# Patient Record
Sex: MALE | Race: WHITE | NOT HISPANIC OR LATINO | Employment: OTHER | URBAN - METROPOLITAN AREA
[De-identification: names, ages, dates, MRNs, and addresses within clinical notes are randomized per-mention and may not be internally consistent; named-entity substitution may affect disease eponyms.]

---

## 2017-01-04 ENCOUNTER — TELEPHONE (OUTPATIENT)
Dept: RHEUMATOLOGY | Facility: CLINIC | Age: 57
End: 2017-01-04

## 2017-01-04 NOTE — TELEPHONE ENCOUNTER
----- Message from Sarah Barrett sent at 1/3/2017  4:02 PM CST -----  Contact: self@home  Pt called in because he used to see Dr. Stacey Steinberg. Pt has Rheumatoid Arthritis and has not taken any medication in a while. Pt needs to see a Physician, but is looking to come in sooner than 3/2/2017, which was the earliest available.    Please call back at home number    Thank you

## 2017-01-05 ENCOUNTER — TELEPHONE (OUTPATIENT)
Dept: RHEUMATOLOGY | Facility: CLINIC | Age: 57
End: 2017-01-05

## 2017-01-05 NOTE — TELEPHONE ENCOUNTER
----- Message from Sarah Barrett sent at 1/4/2017  4:25 PM CST -----  Contact: self@home  Pt called in returning June's call regarding getting an appt sooner than 3/2/2017    Please call back at home number    Thank you

## 2017-01-11 ENCOUNTER — OFFICE VISIT (OUTPATIENT)
Dept: RHEUMATOLOGY | Facility: CLINIC | Age: 57
End: 2017-01-11
Payer: COMMERCIAL

## 2017-01-11 ENCOUNTER — HOSPITAL ENCOUNTER (OUTPATIENT)
Dept: RADIOLOGY | Facility: HOSPITAL | Age: 57
Discharge: HOME | End: 2017-01-11
Attending: INTERNAL MEDICINE
Payer: COMMERCIAL

## 2017-01-11 VITALS
SYSTOLIC BLOOD PRESSURE: 143 MMHG | RESPIRATION RATE: 20 BRPM | DIASTOLIC BLOOD PRESSURE: 82 MMHG | WEIGHT: 209.38 LBS | HEART RATE: 94 BPM

## 2017-01-11 DIAGNOSIS — M25.471 RIGHT ANKLE SWELLING: ICD-10-CM

## 2017-01-11 DIAGNOSIS — M10.00 ACUTE IDIOPATHIC GOUT, UNSPECIFIED SITE: Primary | ICD-10-CM

## 2017-01-11 DIAGNOSIS — M10.00 ACUTE IDIOPATHIC GOUT, UNSPECIFIED SITE: ICD-10-CM

## 2017-01-11 DIAGNOSIS — M25.50 ARTHRALGIA, UNSPECIFIED JOINT: ICD-10-CM

## 2017-01-11 PROCEDURE — 99204 OFFICE O/P NEW MOD 45 MIN: CPT | Mod: S$GLB,,, | Performed by: INTERNAL MEDICINE

## 2017-01-11 PROCEDURE — 99999 PR PBB SHADOW E&M-EST. PATIENT-LVL III: CPT | Mod: PBBFAC,,, | Performed by: INTERNAL MEDICINE

## 2017-01-11 PROCEDURE — 77077 JOINT SURVEY SINGLE VIEW: CPT | Mod: 26,,, | Performed by: RADIOLOGY

## 2017-01-11 PROCEDURE — 1159F MED LIST DOCD IN RCRD: CPT | Mod: S$GLB,,, | Performed by: INTERNAL MEDICINE

## 2017-01-11 PROCEDURE — 77077 JOINT SURVEY SINGLE VIEW: CPT | Mod: TC

## 2017-01-11 RX ORDER — OMEGA-3-ACID ETHYL ESTERS 1 G/1
CAPSULE, LIQUID FILLED ORAL
Refills: 0 | COMMUNITY
Start: 2016-10-11

## 2017-01-11 RX ORDER — CETIRIZINE HYDROCHLORIDE 5 MG/1
5 TABLET ORAL DAILY
COMMUNITY
End: 2021-07-02 | Stop reason: CLARIF

## 2017-01-11 RX ORDER — COLCHICINE 0.6 MG/1
0.6 TABLET ORAL DAILY
Qty: 30 TABLET | Refills: 2 | Status: SHIPPED | OUTPATIENT
Start: 2017-01-11 | End: 2017-02-08 | Stop reason: SDUPTHER

## 2017-01-11 RX ORDER — GABAPENTIN 300 MG/1
300 CAPSULE ORAL
COMMUNITY
Start: 2017-01-10

## 2017-01-11 RX ORDER — METOPROLOL SUCCINATE 100 MG/1
TABLET, EXTENDED RELEASE ORAL
COMMUNITY
Start: 2017-01-10

## 2017-01-11 RX ORDER — DULOXETIN HYDROCHLORIDE 60 MG/1
CAPSULE, DELAYED RELEASE ORAL
COMMUNITY
Start: 2017-01-10

## 2017-01-11 ASSESSMENT — ROUTINE ASSESSMENT OF PATIENT INDEX DATA (RAPID3)
AM STIFFNESS SCORE: 1, YES
MDHAQ FUNCTION SCORE: .7
FATIGUE SCORE: 10
PAIN SCORE: 9
WHEN YOU AWAKENED IN THE MORNING OVER THE LAST WEEK, PLEASE INDICATE THE AMOUNT OF TIME IT TAKES UNTIL YOU ARE AS LIMBER AS YOU WILL BE FOR THE DAY: 1 HR
PSYCHOLOGICAL DISTRESS SCORE: 4.4
TOTAL RAPID3 SCORE: 6.11
PATIENT GLOBAL ASSESSMENT SCORE: 7

## 2017-01-11 NOTE — PROGRESS NOTES
I have personally taken the history and examined the patient to verify the fellow's notation, and I agree with the impression and plan stated.     He has documented polyarticular gout and returns with clinical tophi on physical examination.  He is currently suffering from acute gout in the right ankle so I agree with the fellow's plan to start colchicine acutely, obtain laboratory studies, and then add allopurinol in the very near future.  He understands that he will need to stay on allopurinol.

## 2017-01-11 NOTE — PROGRESS NOTES
Subjective:       Patient ID: Turner Silva is a 56 y.o. male.    Chief Complaint:Gout    HPI This is 56 yr old male with pmh significant for DM, CAD, HTN, Hyperlipidemia, obesity , who is coming in with complains of joint pain . He states many years ago he was having joint pain and was seen by rheumatology Dr Steinberg , he was found to have positive RF 28 and was started on treatment with methotrexate which he took for a year and stopped, he never followed up with rheumatologY  Pt states he has h/o since 19 yr he has h/o of crystal proven gout, first started in the R big toe. He was taking allopurinol 300 mg for 2-3 yrs and then stopped taking it. He has h/o of uric acid stones. He has been treated with colchicine and indomethacin in the past.   He was in usual state of health and a week before cesilia he started having joint pain involving his MCP's, wrists, elbows, Knees elsa and since few days he mainly has joint pain and swelling in his R ankle . He has been taking ibuprofen 2-3 tabs daily which helps with joint pain.He denies any fever, chills, no h/o of trauma.    Pt denies any alopecia,dyshphagia,raynauds,headahce, swollen glands,tight skin,photosensitivity,skin rashes,oral/nasal ulcers,pleurisy, pericarditis,no thromboses.            Review of Systems   Constitutional: Negative for activity change, appetite change, chills, fatigue, fever and unexpected weight change.   HENT: Negative for ear pain, facial swelling, mouth sores and nosebleeds.         Dry mouth   Eyes: Negative for photophobia, pain, redness and itching.        Dry eyes   Respiratory: Negative for cough, chest tightness, shortness of breath and wheezing.    Cardiovascular: Negative for chest pain, palpitations and leg swelling.   Gastrointestinal: Negative for abdominal distention, abdominal pain, constipation, diarrhea, nausea and vomiting.   Genitourinary: Negative for difficulty urinating, dysuria, flank pain, hematuria and  urgency.   Musculoskeletal: Negative for arthralgias, back pain, gait problem, joint swelling, myalgias, neck pain and neck stiffness.   Skin: Negative for color change, pallor and rash.   Neurological: Negative for seizures, weakness, light-headedness, numbness and headaches.   Hematological: Negative for adenopathy.   Psychiatric/Behavioral: Negative for agitation, behavioral problems, confusion and sleep disturbance. The patient is not nervous/anxious.        Past Medical History   Diagnosis Date    Gout     Hyperlipidemia     Hypertension     Myocardial infarction      Past Surgical History   Procedure Laterality Date    Carpal tunnel release Bilateral     Cervical fusion       X 3    Laminectomy      Shoulder surgery Left     Rotator cuff repair Left      X 2         Social History     Social History    Marital status:      Spouse name: N/A    Number of children: N/A    Years of education: N/A     Occupational History    Not on file.     Social History Main Topics    Smoking status: Not on file    Smokeless tobacco: Not on file    Alcohol use Not on file    Drug use: Not on file    Sexual activity: Not on file     Other Topics Concern    Not on file     Social History Narrative     No current outpatient prescriptions on file prior to visit.     No current facility-administered medications on file prior to visit.      Review of patient's allergies indicates:  Allergies not on file      Objective:   There were no vitals taken for this visit.     Physical Exam   Constitutional: He is oriented to person, place, and time and well-developed, well-nourished, and in no distress. No distress.   HENT:   Head: Normocephalic and atraumatic.   Nose: Nose normal.   Mouth/Throat: Oropharynx is clear and moist.   No rashes,scaling,alopecia, oral ulcers.  Ear lobe tophi present .   Eyes: Conjunctivae and EOM are normal. Pupils are equal, round, and reactive to light.   Neck: Normal range of motion.  Neck supple. No thyromegaly present.   Cardiovascular: Normal rate, regular rhythm and normal heart sounds.  Exam reveals no friction rub.    No murmur heard.  Pulmonary/Chest: Effort normal and breath sounds normal. No respiratory distress. He has no wheezes. He has no rales.   Abdominal: Soft. Bowel sounds are normal. He exhibits no distension. There is no tenderness.   Lymphadenopathy:     He has no cervical adenopathy.   Neurological: He is alert and oriented to person, place, and time.   Skin: Skin is warm. No rash noted. He is not diaphoretic.     Psychiatric: Affect and judgment normal.   Musculoskeletal: Normal range of motion. He exhibits no edema or tenderness.   Cspine FROM no tenderness  Tspine FROM no tenderness  Lspine FROM no tenderness.  TMJ: unremarkable  Shoulders: FROM; no synovitis;  Elbows: FROM; R elbow nodules .  Wrists: FROM; no synovitis;   MCPs: FROM; no synovitis; + metacarpalgia;  ok;  PIPs:FROM; no synovitis;   DIPs: FROM; no synovitis;   HIPS: FROM  Knees: FROM; no synovitis; no instability;  Ankles: R ankle very tender to palpation, swollen, limited range of motion, L ankle non tender ,no swelling.  Toes: ok; no metatarsalgia            Results for ARIC MARINA (MRN [id number]) as of 1/11/2017 10:13   Ref. Range 1/16/2012 12:05   WBC Latest Ref Range: 4.8 - 10.8 K/uL 8.28   RBC Latest Ref Range: 4.60 - 6.20 M/uL 4.91   Hemoglobin Latest Ref Range: 14.0 - 18.0 gm/dl 14.5   Hematocrit Latest Ref Range: 40.0 - 54.0 % 42.5   MCV Latest Ref Range: 82 - 95 fL 86.6   MCH Latest Ref Range: 27 - 31 pg 29.5   MCHC Latest Ref Range: 32 - 36 % 34.1   RDW Latest Ref Range: 11.5 - 14.5 % 14.1   Platelets Latest Ref Range: 150 - 350 K/uL 271   MPV Latest Ref Range: 9.2 - 12.9 fL 10.2   Gran% Latest Ref Range: 38 - 73 % 66.4   Gran # Latest Ref Range: 1.8 - 7.7 K/uL 5.5   Lymph% Latest Ref Range: 21 - 44 % 25.8   Lymph # Latest Ref Range: 1 - 4.8 K/uL 2.1   Mono% Latest Ref Range: 0.0 - 7.4 %  5.7   Mono # Latest Ref Range: 0.0 - 0.8 K/uL 0.5   Eosinophil% Latest Ref Range: 0.0 - 4.2 % 1.3   Eos # Latest Ref Range: 0 - 0.45 K/uL 0.1   Basophil% Latest Ref Range: 0 - 1.9 % 0.8   Baso # Latest Ref Range: 0.0 - 0.2 K/uL 0.1   Sed Rate Latest Ref Range: 0 - 10 mm/hr 5   Sodium Latest Ref Range: 136 - 145 mmol/L 140   Potassium Latest Ref Range: 3.5 - 5.1 mmol/L 3.3 (L)   Chloride Latest Ref Range: 95 - 110 mmol/L 104   CO2 Latest Ref Range: 23 - 29 mmol/L 24   Anion Gap Latest Ref Range: 8 - 16 mmol/L 12.0   BUN, Bld Latest Ref Range: 6 - 20 mg/dl 13   Creatinine Latest Ref Range: 0.5 - 1.4 mg/dl 0.9   eGFR if non African American Latest Ref Range: >60 mL/min >60   eGFR if  Latest Ref Range: >60 mL/min >60   Glucose Latest Ref Range: 70 - 110 mg/dl 179 (H)   Calcium Latest Ref Range: 8.7 - 10.5 mg/dl 9.1   Alkaline Phosphatase Latest Ref Range: 55 - 135 U/L 76   Total Protein Latest Ref Range: 6.0 - 8.4 g/dL 7.0   Albumin Latest Ref Range: 3.5 - 5.2 g/dl 3.7   Uric Acid Latest Ref Range: 3.4 - 7.0 mg/dl 5.9   Total Bilirubin Latest Ref Range: 0.1 - 1.0 mg/dl 0.4   AST Latest Ref Range: 10 - 40 U/L 14   ALT Latest Ref Range: 10 - 44 U/L 19     Assessment:     This is a 56 yr old male with h/o of HTN, Hyperlipidemia, DM, obesity , uric acid renal stones, h/o of crystal proven gout and tophaceous gout , who is coming in with complains of R ankle pain and swelling and joint pain involving the MCP's, Wrists, elbows , knees elsa.    -R ankle pain and swelling sec to gout attack   -Chronic tophaceous gout  -Arthralgias invovling the MCP's, wrists, elbow and knees elsa  -H/o of uric acid calculi  -HTN  -DM  -Obesity.    Plan;  Check cbc, cmp, esr, crp, RF, CCP, DORIS, uric acid, arthritis survey.  Advised to take colchicine 0.6 mg 2 tabs once then one tab an hour later and continue taking 0.6 bid from next day , and if he has diarrhea advised to take only one tab daily.  He will need long term  hyperuricemic therapy with allopurinol once gout attack resolved.    RTC in 3 weeks.

## 2017-01-13 ENCOUNTER — TELEPHONE (OUTPATIENT)
Dept: RHEUMATOLOGY | Facility: CLINIC | Age: 57
End: 2017-01-13

## 2017-02-08 ENCOUNTER — OFFICE VISIT (OUTPATIENT)
Dept: RHEUMATOLOGY | Facility: CLINIC | Age: 57
End: 2017-02-08
Payer: COMMERCIAL

## 2017-02-08 VITALS
WEIGHT: 210.88 LBS | BODY MASS INDEX: 31.23 KG/M2 | SYSTOLIC BLOOD PRESSURE: 126 MMHG | HEART RATE: 68 BPM | DIASTOLIC BLOOD PRESSURE: 75 MMHG | HEIGHT: 69 IN

## 2017-02-08 DIAGNOSIS — M10.00 IDIOPATHIC GOUT, UNSPECIFIED CHRONICITY, UNSPECIFIED SITE: Primary | ICD-10-CM

## 2017-02-08 DIAGNOSIS — M1A.9XX1 TOPHACEOUS GOUT: ICD-10-CM

## 2017-02-08 PROCEDURE — 99999 PR PBB SHADOW E&M-EST. PATIENT-LVL III: CPT | Mod: PBBFAC,,, | Performed by: INTERNAL MEDICINE

## 2017-02-08 PROCEDURE — 99214 OFFICE O/P EST MOD 30 MIN: CPT | Mod: S$GLB,,, | Performed by: INTERNAL MEDICINE

## 2017-02-08 RX ORDER — COLCHICINE 0.6 MG/1
0.6 TABLET ORAL DAILY
Qty: 90 TABLET | Refills: 0 | Status: SHIPPED | OUTPATIENT
Start: 2017-02-08 | End: 2017-05-24 | Stop reason: SDUPTHER

## 2017-02-08 RX ORDER — VIT C/E/ZN/COPPR/LUTEIN/ZEAXAN 250MG-90MG
1000 CAPSULE ORAL DAILY
COMMUNITY

## 2017-02-08 RX ORDER — ALLOPURINOL 100 MG/1
300 TABLET ORAL 3 TIMES DAILY
Qty: 270 TABLET | Refills: 2 | Status: SHIPPED | OUTPATIENT
Start: 2017-02-08 | End: 2017-02-15

## 2017-02-08 ASSESSMENT — ROUTINE ASSESSMENT OF PATIENT INDEX DATA (RAPID3)
AM STIFFNESS SCORE: 1, YES
MDHAQ FUNCTION SCORE: .5
FATIGUE SCORE: 8
PSYCHOLOGICAL DISTRESS SCORE: 3.3
PATIENT GLOBAL ASSESSMENT SCORE: 5
WHEN YOU AWAKENED IN THE MORNING OVER THE LAST WEEK, PLEASE INDICATE THE AMOUNT OF TIME IT TAKES UNTIL YOU ARE AS LIMBER AS YOU WILL BE FOR THE DAY: 1 HOUR
TOTAL RAPID3 SCORE: 3.56
PAIN SCORE: 4

## 2017-02-08 NOTE — PROGRESS NOTES
I have personally taken the history and examined the patient to verify the fellow's notation, and I agree with the impression and plan stated.   Acute sx have resolved so will now shift to long term treatment of tophaceous gout with urate lowering therapy.  WD

## 2017-02-08 NOTE — PATIENT INSTRUCTIONS
Gout    Gout is an inflammation of a joint due to a build-up of gout crystals in the joint fluid. This occurs when there is an excess of uric acid (a normal waste product) in the body. Uric acid builds up in the body when the kidneys are unable to filter enough of it from the blood. This may occur with age. It is also associated with kidney disease. Gout occurs more often in persons with obesity, diabetes, hypertension, or high levels of fats in the blood. It may be run in families. Gout tends to come and go. A flare up of gout is called an attack. Drinking alcohol or eating certain foods (such as shellfish or foods with additives such as high-fructose corn syrup) may increase uric acid levels in the blood and cause a gout attack.  During a gout attack, the affected joint may become a hot, red, swollen and painful. If you have had one attack of gout, you are likely to have another. An attack of gout can be treated with medicine. If these attacks become frequent, a daily medicine may be prescribed to help the kidneys remove uric acid from the body.  Home care  During a gout attack:  · Rest painful joints. If gout affects the joints of your foot or leg, you may want to use crutches for the first few days to keep from bearing weight on the affected joint.  · When sitting or lying down, raise the painful joint to a level higher than your heart.  · Apply an ice pack (ice cubes in a plastic bag wrapped in a thin towel) over the injured area for 20 minutes every 1-2 hours the first day for pain relief. Continue this 3-4 times a day for swelling and pain.  · Avoid alcohol and foods listed below (see Preventing attacks) during a gout attack. Drink extra fluid to help flush the uric acid through your kidneys.  · If you were prescribed a medication to treat gout, take it as your healthcare provider has instructed. Don't skip doses.  · Take anti-inflammatory medicine as directed.   · If pain medicines have been prescribed,  take them exactly as directed.    Preventing attacks  · Minimize or avoid alcohol use. Excess alcohol intake can cause a gout attack.  · Limit these foods and beverages:  ¨ Organ meats, such as kidneys and liver  ¨ Certain seafoods (anchovies, sardines, shrimp, scallops, herring, mackerel)  ¨ Wild game, meat extracts and meat gravies  ¨ Foods and beverages sweetened with high-fructose corn syrup, such as sodas  · Eat a healthy diet including low-fat and nonfat dairy, whole grains, and vegetables.  · If you are overweight, talk to your healthcare provider about a weight reduction plan. Avoid fasting or extreme low calorie diets (less than 900 calories per day). This will increase uric acid levels in the body.  · If you have diabetes or high blood pressure, work with your doctor to manage these conditions.  · Protect the joint from injury. Trauma can trigger a gout attack.  Follow-up care  Follow up with your healthcare provider or as advised.   When to seek medical advice  Call your healthcare provider if you have any of the following:  · Fever over 100.4°F (38.ºC) with worsening joint pain  · Increasing redness around the joint  · Pain developing in another joint  · Repeated vomiting, abdominal pain, or blood in the vomit or stool (black or red color)  Date Last Reviewed: 5/14/2015  © 1880-4085 [employer]. [address] [place] [address], [place] [address]. All rights reserved. This information is not intended as a substitute for professional medical care. Always follow your healthcare professional's instructions.        Treating Gout Attacks     Raising the joint above the level of your heart can help reduce gout symptoms.     Gout is a disease that affects the joints. It is caused by excess uric acid in your blood stream that may lead to crystals forming in your joints. Left untreated, it can lead to painful foot and joint deformities and even kidney problems. But, by treating gout early, you can relieve  pain and help prevent future problems. Gout can usually be treated with medication and proper diet. In severe cases, surgery may be needed.  Gout attacks are painful and often happen more than once. Taking medications may reduce pain and prevent attacks in the future. There are also some things you can do at home to relieve symptoms.  Medications for gout  Your healthcare provider may prescribe a daily medication to reduce levels of uric acid. Reducing your uric acid levels may help prevent gout attacks. Allopurinol is one commonly used medication taken daily to reduce uric acid levels. Other medications can help relieve pain and swelling during an acute attack. Medicines such as NSAIDs (nonsteroidal anti-inflammatory medicines), steroids, and colchicine may be prescribed for intermittent use to relieve an acute gout attack. Be sure to take your medication as directed.  What you can do  Below are some things you can do at home to relieve gout symptoms. Your healthcare provider may have other tips.  · Rest the painful joint as much as you can.  · Raise the painful joint so it is at a level higher than your heart.  · Use ice for 10 minutes every 1-2 hours as possible.  How can I prevent gout?  With a little effort, you may be able to prevent gout attacks in the future. Here are some things you can do:  · Avoid foods high in purines  ¨ Certain meats (red meat, processed meat, turkey)  ¨ Organ meats (kidney, liver, sweetbread)  ¨ Shellfish (lobster, crab, shrimp, scallop, mussel)  ¨ Certain fish (anchovy, sardine, herring, mackerel)  · Take any medications prescribed by your healthcare provider.  · Lose weight if you need to.  · Reduce high fructose corn syrup in meals and drinks.  · Reduce or eliminate consumption of alcohol, particularly beer, but also red wine and spirits.  · Control blood pressure, diabetes, and cholesterol.  · Drink plenty of water to help flush uric acid from your body.  Date Last Reviewed:  2/1/2016  © 8873-9438 [employer]. [address] [place] [address], [place] [address]. All rights reserved. This information is not intended as a substitute for professional medical care. Always follow your healthcare professional's instructions.        Gout Diet  Gout is a painful condition caused by an excess of uric acid, a waste product made by the body. Uric acid forms crystals that collect in the joints. The immune response to these crystals brings on symptoms of joint pain and swelling. This is called a gout attack. Often, medications and diet changes are combined to manage gout. Below are some guidelines for changing your diet to help you manage gout and prevent attacks. Your health care provider will help you determine the best eating plan for you.     Eating to manage gout  Weight loss for those who are overweight may help reduce gout attacks.  Eat less of these foods  Eating too many foods containing purines may raise the levels of uric acid in your body. This raises your risk for a gout attack. Try to limit these foods and drinks:  · Alcohol, such as beer and red wine. You may be told to avoid alcohol completely.  · Soft drinks that contain sugar or high fructose corn syrup  · Certain fish, including anchovies, sardines, fish eggs, and herring  · Shellfish  · Certain meats, such as red meat, hot dogs, luncheon meats, and turkey  · Organ meats, such as liver, kidneys, and sweetbreads  · Legumes, such as dried beans and peas  · Other high fat foods such as gravy, whole milk, and high fat cheeses  · Vegetables such as asparagus, cauliflower, spinach, and mushrooms used to be thought to contribute to an increased risk for a gout attack, but recent studies show that high purine vegetables don't increase the risk for a gout attack.  Eat more of these foods  Other foods may be helpful for people with gout. Add some of these foods to your diet:  · Cherries contain chemicals that may lower uric  acid.  · Omega fatty acids. These are found in some fatty fish such as salmon, certain oils (flax, olive, or nut), and nuts themselves. Omega fatty acids may help prevent inflammation due to gout.  · Dairy products that are low-fat or fat-free, such as cheese and yogurt  · Complex carbohydrate foods, including whole grains, brown rice, oats, and beans  · Coffee, in moderation  · Water, approximately 64 ounces per day  Follow-up care  Follow up with your healthcare provider as advised.  When to seek medical advice  Call your healthcare provider right away if any of these occur:  · Return of gout symptoms, usually at night:  · Severe pain, swelling, and heat in a joint, especially the base of the big toe  · Affected joint is hard to move  · Skin of the affected joint is purple or red  · Fever of 100.4°F (38°C) or higher  · Pain that doesn't get better even with prescribed medicine   Date Last Reviewed: 1/12/2016  © 8799-3007 [employer]. [address] [place] [address], [place] [address]. All rights reserved. This information is not intended as a substitute for professional medical care. Always follow your healthcare professional's instructions.

## 2017-02-08 NOTE — PROGRESS NOTES
"Subjective:       Patient ID: Turner Silva is a 56 y.o. male.    Chief Complaint: Polyarticular gout     HPI 56 yr old male with pmh significant for DM, CAD, HTN, Hyperlipidemia, obesity ,since 19 yr he has h/o of crystal proven gout,h/o of podagra and tophaceous gout. He was taking allopurinol 300 mg for 2-3 yrs and then stopped taking it. He has h/o of uric acid stones. He has been treated with colchicine and indomethacin in the past.   Last visit pt was seen for gout flare in his R ankle , advised to take colchicine 0.6 take colchicine 0.6 mg 2 tabs once then one tab an hour later and continue taking 0.6 bid . His gout flare in R ankle has resolved. He denies any joint pain or swelling. His uric acid was 7.8 .Denies any other symptoms.     Review of Systems   Constitutional: Negative for activity change, appetite change, chills, fatigue, fever and unexpected weight change.   HENT: Negative for ear pain, facial swelling, mouth sores and nosebleeds.    Eyes: Negative for photophobia, pain, redness and itching.   Respiratory: Negative for cough, chest tightness, shortness of breath and wheezing.    Cardiovascular: Negative for chest pain, palpitations and leg swelling.   Gastrointestinal: Negative for abdominal distention, abdominal pain, constipation, diarrhea, nausea and vomiting.   Genitourinary: Negative for difficulty urinating, dysuria, flank pain, hematuria and urgency.   Musculoskeletal: Negative for arthralgias, back pain, gait problem, joint swelling, myalgias, neck pain and neck stiffness.   Skin: Negative for color change, pallor and rash.   Neurological: Negative for seizures, weakness, light-headedness, numbness and headaches.   Hematological: Negative for adenopathy.   Psychiatric/Behavioral: Negative for agitation, behavioral problems, confusion and sleep disturbance. The patient is not nervous/anxious.          Objective:     Visit Vitals    /75    Pulse 68    Ht 5' 9" (1.753 m)    " Wt 95.7 kg (210 lb 14.4 oz)    BMI 31.14 kg/m2        Physical Exam   Constitutional: He is oriented to person, place, and time and well-developed, well-nourished, and in no distress. No distress.   HENT:   Head: Normocephalic and atraumatic.   Nose: Nose normal.   Mouth/Throat: Oropharynx is clear and moist.   No rashes,scaling,alopecia, oral ulcers  Has tophi on ear lobes   Eyes: Conjunctivae and EOM are normal. Pupils are equal, round, and reactive to light.   Neck: Normal range of motion. Neck supple. No thyromegaly present.   Cardiovascular: Normal rate, regular rhythm and normal heart sounds.  Exam reveals no friction rub.    No murmur heard.  Pulmonary/Chest: Effort normal and breath sounds normal. No respiratory distress. He has no wheezes. He has no rales.   Abdominal: Soft. Bowel sounds are normal. He exhibits no distension. There is no tenderness.   Lymphadenopathy:     He has no cervical adenopathy.   Neurological: He is alert and oriented to person, place, and time.   Skin: Skin is warm. No rash noted. He is not diaphoretic.     Psychiatric: Affect and judgment normal.   Musculoskeletal: Normal range of motion. He exhibits no edema or tenderness.   Cspine FROM no tenderness  Tspine FROM no tenderness  Lspine FROM no tenderness.  TMJ: unremarkable  Shoulders: FROM; no synovitis;  Elbows: FROM; no synovitis; R elbow nodular   Wrists: FROM; no synovitis;   MCPs: FROM; no synovitis; no metacarpalgia;  ok;  PIPs:FROM; no synovitis;   DIPs: FROM; no synovitis;   HIPS: FROM  Knees: FROM; no synovitis; no instability;  Ankles: FROM: no synovitis   Toes: ok; no metatarsalgia          Results for ARIC MARINA (MRN [id number]) as of 2/8/2017 09:20   Ref. Range 1/11/2017 11:25   WBC Latest Ref Range: 3.90 - 12.70 K/uL 9.41   RBC Latest Ref Range: 4.60 - 6.20 M/uL 4.83   Hemoglobin Latest Ref Range: 14.0 - 18.0 g/dL 14.3   Hematocrit Latest Ref Range: 40.0 - 54.0 % 40.7   MCV Latest Ref Range: 82 - 98 fL 84    MCH Latest Ref Range: 27.0 - 31.0 pg 29.6   MCHC Latest Ref Range: 32.0 - 36.0 % 35.1   RDW Latest Ref Range: 11.5 - 14.5 % 13.2   Platelets Latest Ref Range: 150 - 350 K/uL 332   MPV Latest Ref Range: 9.2 - 12.9 fL 9.7   Gran% Latest Ref Range: 38.0 - 73.0 % 66.8   Gran # Latest Ref Range: 1.8 - 7.7 K/uL 6.3   Lymph% Latest Ref Range: 18.0 - 48.0 % 22.8   Lymph # Latest Ref Range: 1.0 - 4.8 K/uL 2.2   Mono% Latest Ref Range: 4.0 - 15.0 % 7.2   Mono # Latest Ref Range: 0.3 - 1.0 K/uL 0.7   Eosinophil% Latest Ref Range: 0.0 - 8.0 % 2.4   Eos # Latest Ref Range: 0.0 - 0.5 K/uL 0.2   Basophil% Latest Ref Range: 0.0 - 1.9 % 0.6   Baso # Latest Ref Range: 0.00 - 0.20 K/uL 0.06   Sed Rate Latest Ref Range: 0 - 10 mm/Hr 47 (H)   Sodium Latest Ref Range: 136 - 145 mmol/L 137   Potassium Latest Ref Range: 3.5 - 5.1 mmol/L 3.9   Chloride Latest Ref Range: 95 - 110 mmol/L 101   CO2 Latest Ref Range: 23 - 29 mmol/L 26   Anion Gap Latest Ref Range: 8 - 16 mmol/L 10   BUN, Bld Latest Ref Range: 6 - 20 mg/dL 13   Creatinine Latest Ref Range: 0.5 - 1.4 mg/dL 1.1   eGFR if non African American Latest Ref Range: >60 mL/min/1.73 m^2 >60.0   eGFR if African American Latest Ref Range: >60 mL/min/1.73 m^2 >60.0   Glucose Latest Ref Range: 70 - 110 mg/dL 110   Calcium Latest Ref Range: 8.7 - 10.5 mg/dL 9.4   Alkaline Phosphatase Latest Ref Range: 55 - 135 U/L 81   Total Protein Latest Ref Range: 6.0 - 8.4 g/dL 8.3   Albumin Latest Ref Range: 3.5 - 5.2 g/dL 3.7   Uric Acid Latest Ref Range: 3.4 - 7.0 mg/dL 7.8 (H)   Total Bilirubin Latest Ref Range: 0.1 - 1.0 mg/dL 0.6   AST Latest Ref Range: 10 - 40 U/L 24   ALT Latest Ref Range: 10 - 44 U/L 26   CRP Latest Ref Range: 0.0 - 8.2 mg/L 12.5 (H)   CCP Antibodies Latest Ref Range: <5.0 U/mL <0.5   Rheumatoid Factor Latest Ref Range: 0.0 - 15.0 IU/mL <10.0        Assessment:     This is a 56 yr old male with h/o of HTN, Hyperlipidemia, DM, obesity , uric acid renal stones, h/o of crystal  proven gout and tophaceous gout , who is coming in with complains of R ankle pain and swelling and joint pain involving the MCP's, Wrists, elbows , knees elsa.     -Polyarticular gout   -Chronic tophaceous gout  -Hyperuricemia   -H/o of uric acid calculi  -HTN  -DM  -Obesity.     Plan;  Check labs today  Start  allopurinol 300 mg daily.  Continue colchicine 0.6 mg daily.  Hand out on dietary modification for gout given to patient.         RTC in 3 months

## 2017-02-10 ENCOUNTER — TELEPHONE (OUTPATIENT)
Dept: RHEUMATOLOGY | Facility: CLINIC | Age: 57
End: 2017-02-10

## 2017-02-14 ENCOUNTER — TELEPHONE (OUTPATIENT)
Dept: RHEUMATOLOGY | Facility: CLINIC | Age: 57
End: 2017-02-14

## 2017-02-14 NOTE — TELEPHONE ENCOUNTER
----- Message from Chel Christianson sent at 2/14/2017  9:36 AM CST -----  Contact: luiza( [facility])[phone]  She is calling to request a 90 day supply for rx allopurinol (ZYLOPRIM) 100 MG tablet.

## 2017-02-15 RX ORDER — ALLOPURINOL 300 MG/1
300 TABLET ORAL DAILY
Qty: 90 TABLET | Refills: 3 | Status: SHIPPED | OUTPATIENT
Start: 2017-02-15 | End: 2017-05-16

## 2017-05-24 RX ORDER — COLCHICINE 0.6 MG/1
0.6 TABLET ORAL DAILY
Qty: 90 TABLET | Refills: 0 | Status: SHIPPED | OUTPATIENT
Start: 2017-05-24 | End: 2022-07-26

## 2017-12-20 ENCOUNTER — OFFICE VISIT (OUTPATIENT)
Dept: URGENT CARE | Facility: CLINIC | Age: 57
End: 2017-12-20
Payer: COMMERCIAL

## 2017-12-20 VITALS
BODY MASS INDEX: 31.4 KG/M2 | OXYGEN SATURATION: 97 % | TEMPERATURE: 98 F | RESPIRATION RATE: 12 BRPM | WEIGHT: 212 LBS | HEIGHT: 69 IN | HEART RATE: 105 BPM | SYSTOLIC BLOOD PRESSURE: 110 MMHG | DIASTOLIC BLOOD PRESSURE: 67 MMHG

## 2017-12-20 DIAGNOSIS — J10.1 INFLUENZA A: Primary | ICD-10-CM

## 2017-12-20 LAB
CTP QC/QA: YES
FLUAV AG NPH QL: POSITIVE
FLUBV AG NPH QL: NEGATIVE

## 2017-12-20 PROCEDURE — 99203 OFFICE O/P NEW LOW 30 MIN: CPT | Mod: 25,S$GLB,, | Performed by: NURSE PRACTITIONER

## 2017-12-20 PROCEDURE — 87804 INFLUENZA ASSAY W/OPTIC: CPT | Mod: QW,S$GLB,, | Performed by: NURSE PRACTITIONER

## 2017-12-20 RX ORDER — OSELTAMIVIR PHOSPHATE 75 MG/1
75 CAPSULE ORAL 2 TIMES DAILY
Qty: 10 CAPSULE | Refills: 0 | Status: SHIPPED | OUTPATIENT
Start: 2017-12-20 | End: 2017-12-25

## 2017-12-20 RX ORDER — CODEINE PHOSPHATE AND GUAIFENESIN 10; 100 MG/5ML; MG/5ML
5 SOLUTION ORAL NIGHTLY PRN
Qty: 118 ML | Refills: 0 | Status: SHIPPED | OUTPATIENT
Start: 2017-12-20 | End: 2017-12-30

## 2017-12-20 RX ORDER — BENZONATATE 100 MG/1
200 CAPSULE ORAL 3 TIMES DAILY PRN
Qty: 40 CAPSULE | Refills: 0 | Status: SHIPPED | OUTPATIENT
Start: 2017-12-20 | End: 2017-12-30

## 2017-12-20 NOTE — PROGRESS NOTES
"Subjective:       Patient ID: Turner Silva is a 57 y.o. male.    Vitals:  height is 5' 9" (1.753 m) and weight is 96.2 kg (212 lb). His oral temperature is 98.4 °F (36.9 °C). His blood pressure is 110/67 and his pulse is 105. His respiration is 12 and oxygen saturation is 97%.     Chief Complaint: URI    Pt reports fever of 101 at home over the past 3 days.      URI    This is a new problem. The current episode started in the past 7 days. The problem has been unchanged. The maximum temperature recorded prior to his arrival was 101 - 101.9 F. The fever has been present for 1 to 2 days. Associated symptoms include chest pain, congestion, coughing, ear pain, headaches, joint pain, a plugged ear sensation, rhinorrhea, sinus pain, a sore throat and wheezing. Pertinent negatives include no abdominal pain, diarrhea, nausea, sneezing or vomiting. Treatments tried: Nyquil & Dayquil. The treatment provided mild relief.     Review of Systems   Constitution: Positive for chills, diaphoresis, fever and malaise/fatigue.   HENT: Positive for congestion, ear pain, hoarse voice, rhinorrhea, sinus pain and sore throat. Negative for sneezing.    Eyes: Negative for discharge and redness.   Cardiovascular: Positive for chest pain. Negative for dyspnea on exertion and leg swelling.   Respiratory: Positive for cough, sputum production and wheezing. Negative for shortness of breath.    Musculoskeletal: Positive for joint pain and myalgias.   Gastrointestinal: Negative for abdominal pain, diarrhea, nausea and vomiting.   Neurological: Positive for headaches.   All other systems reviewed and are negative.      Objective:      Physical Exam   Constitutional: He is oriented to person, place, and time. Vital signs are normal. He appears well-developed and well-nourished. He is cooperative.  Non-toxic appearance. He does not have a sickly appearance. He appears ill. No distress.   HENT:   Head: Normocephalic and atraumatic.   Right Ear: " Hearing, external ear and ear canal normal. A middle ear effusion is present.   Left Ear: Hearing, external ear and ear canal normal. A middle ear effusion is present.   Nose: Mucosal edema and rhinorrhea present. Right sinus exhibits maxillary sinus tenderness. Left sinus exhibits maxillary sinus tenderness.   Mouth/Throat: Uvula is midline and mucous membranes are normal. Posterior oropharyngeal erythema present.   Eyes: Conjunctivae and lids are normal.   Neck: Normal range of motion and full passive range of motion without pain. Neck supple. No neck rigidity. No edema, no erythema and normal range of motion present.   Cardiovascular: Normal rate, regular rhythm and normal heart sounds.    Pulmonary/Chest: Effort normal and breath sounds normal. No accessory muscle usage. No apnea, no tachypnea and no bradypnea. No respiratory distress. He has no decreased breath sounds. He has no wheezes. He has no rhonchi. He has no rales.   Positive cough throughout exam   Abdominal: Normal appearance.   Lymphadenopathy:        Head (right side): No submental and no submandibular adenopathy present.        Head (left side): No submental and no submandibular adenopathy present.     He has no cervical adenopathy.   Neurological: He is alert and oriented to person, place, and time.   Psychiatric: He has a normal mood and affect. His speech is normal and behavior is normal.   Nursing note and vitals reviewed.      Assessment:       1. Influenza A        Plan:         Influenza A  -     POCT Influenza A/B  -     oseltamivir (TAMIFLU) 75 MG capsule; Take 1 capsule (75 mg total) by mouth 2 (two) times daily.  Dispense: 10 capsule; Refill: 0  -     benzonatate (TESSALON PERLES) 100 MG capsule; Take 2 capsules (200 mg total) by mouth 3 (three) times daily as needed for Cough.  Dispense: 40 capsule; Refill: 0  -     guaifenesin-codeine 100-10 mg/5 ml (TUSSI-ORGANIDIN NR)  mg/5 mL syrup; Take 5 mLs by mouth nightly as needed for  Cough.  Dispense: 118 mL; Refill: 0      Instructed pt to follow up with pcp for no improvement in 7-10 days or go to ER for worsening of symptoms.

## 2017-12-20 NOTE — PATIENT INSTRUCTIONS
Please drink plenty of fluids.  Please get plenty of rest.    Please return here or go to the Emergency Department for any concerns or worsening of condition.    If you were prescribed antivirals, please take them to completion.    If you were prescribed a narcotic medication, do not drive or operate heavy equipment or machinery while taking these medications.    It is safe to take Mucinex as directed on bottle for relief of sinus symptoms. Take with at least 2 glasses of water.    If not allergic, please take over the counter Tylenol (Acetaminophen) and/or Motrin (Ibuprofen) as directed on bottle for control of pain and/or fever.    Please follow up with your primary care doctor or specialist as needed.    If you  smoke, please stop smoking.    Influenza (Adult)    Influenza is also called the flu. It is a viral illness that affects the air passages of your lungs. It is different from the common cold. The flu can easily be passed from one to person to another. It may be spread through the air by coughing and sneezing. Or it can be spread by touching the sick person and then touching your own eyes, nose, or mouth.  The flu starts 1 to 3 days after you are exposed to the flu virus. It may last for 1 to 2 weeks but many people feel tired or fatigued for many weeks afterward. You usually dont need to take antibiotics unless you have a complication. This might be an ear or sinus infection or pneumonia.  Symptoms of the flu may be mild or severe. They can include extreme tiredness (wanting to stay in bed all day), chills, fevers, muscle aches, soreness with eye movement, headache, and a dry, hacking cough.  Home care  Follow these guidelines when caring for yourself at home:  · Avoid being around cigarette smoke, whether yours or other peoples.  · Acetaminophen or ibuprofen will help ease your fever, muscle aches, and headache. Dont give aspirin to anyone younger than 18 who has the flu. Aspirin can harm the  liver.  · Nausea and loss of appetite are common with the flu. Eat light meals. Drink 6 to 8 glasses of liquids every day. Good choices are water, sport drinks, soft drinks without caffeine, juices, tea, and soup. Extra fluids will also help loosen secretions in your nose and lungs.  · Over-the-counter cold medicines will not make the flu go away faster. But the medicines may help with coughing, sore throat, and congestion in your nose and sinuses. Dont use a decongestant if you have high blood pressure.  · Stay home until your fever has been gone for at least 24 hours without using medicine to reduce fever.  Follow-up care  Follow up with your healthcare provider, or as advised, if you are not getting better over the next week.  If you are age 65 or older, talk with your provider about getting a pneumococcal vaccine every 5 years. You should also get this vaccine if you have chronic asthma or COPD. All adults should get a flu vaccine every fall. Ask your provider about this.  When to seek medical advice  Call your healthcare provider right away if any of these occur:  · Cough with lots of colored mucus (sputum) or blood in your mucus  · Chest pain, shortness of breath, wheezing, or trouble breathing  · Severe headache, or face, neck, or ear pain  · New rash with fever  · Fever of 100.4°F (38°C) or higher, or as directed by your healthcare provider  · Confusion, behavior change, or seizure  · Severe weakness or dizziness  · You get a new fever or cough after getting better for a few days  Date Last Reviewed: 1/1/2017  © 9474-8976 [employer]. [address] [place] [address], [place] [address]. All rights reserved. This information is not intended as a substitute for professional medical care. Always follow your healthcare professional's instructions.

## 2018-03-02 ENCOUNTER — OFFICE VISIT (OUTPATIENT)
Dept: URGENT CARE | Facility: CLINIC | Age: 58
End: 2018-03-02
Payer: COMMERCIAL

## 2018-03-02 VITALS
TEMPERATURE: 99 F | RESPIRATION RATE: 18 BRPM | HEIGHT: 69 IN | BODY MASS INDEX: 30.07 KG/M2 | WEIGHT: 203 LBS | HEART RATE: 105 BPM | OXYGEN SATURATION: 98 % | SYSTOLIC BLOOD PRESSURE: 109 MMHG | DIASTOLIC BLOOD PRESSURE: 66 MMHG

## 2018-03-02 DIAGNOSIS — L03.114 LEFT ARM CELLULITIS: Primary | ICD-10-CM

## 2018-03-02 PROCEDURE — 99214 OFFICE O/P EST MOD 30 MIN: CPT | Mod: 25,S$GLB,, | Performed by: SURGERY

## 2018-03-02 RX ORDER — CLINDAMYCIN PHOSPHATE 150 MG/ML
600 INJECTION, SOLUTION INTRAVENOUS ONCE
Status: COMPLETED | OUTPATIENT
Start: 2018-03-02 | End: 2018-03-02

## 2018-03-02 RX ORDER — ROSUVASTATIN CALCIUM 20 MG/1
20 TABLET, COATED ORAL DAILY
COMMUNITY

## 2018-03-02 RX ORDER — CLINDAMYCIN HYDROCHLORIDE 300 MG/1
300 CAPSULE ORAL EVERY 6 HOURS
Qty: 28 CAPSULE | Refills: 0 | Status: SHIPPED | OUTPATIENT
Start: 2018-03-02 | End: 2018-03-09

## 2018-03-02 RX ADMIN — CLINDAMYCIN PHOSPHATE 600 MG: 150 INJECTION, SOLUTION INTRAVENOUS at 01:03

## 2018-03-02 NOTE — PATIENT INSTRUCTIONS
Cellulitis  Cellulitis is an infection of the deep layers of skin. A break in the skin, such as a cut or scratch, can let bacteria under the skin. If the bacteria get to deep layers of the skin, it can be serious. If not treated, cellulitis can get into the bloodstream and lymph nodes. The infection can then spread throughout the body. This causes serious illness.  Cellulitis causes the affected skin to become red, swollen, warm, and sore. The reddened areas have a visible border. An open sore may leak fluid (pus). You may have a fever, chills, and pain.  Cellulitis is treated with antibiotics taken for 7 to 10 days. An open sore may be cleaned and covered with cool wet gauze. Symptoms should get better 1 to 2 days after treatment is started. Make sure to take all the antibiotics for the full number of days until they are gone. Keep taking the medicine even if your symptoms go away.  Home care  Follow these tips:  · Limit the use of the part of your body with cellulitis.   · If the infection is on your leg, keep your leg raised while sitting. This will help to reduce swelling.  · Take all of the antibiotic medicine exactly as directed until it is gone. Do not miss any doses, especially during the first 7 days. Dont stop taking the medicine when your symptoms get better.  · Keep the affected area clean and dry.  · Wash your hands with soap and warm water before and after touching your skin. Anyone else who touches your skin should also wash his or her hands. Don't share towels.  Follow-up care  Follow up with your healthcare provider, or as advised. If your infection does not go away on the first antibiotic, your healthcare provider will prescribe a different one.  When to seek medical advice  Call your healthcare provider right away if any of these occur:  · Red areas that spread  · Swelling or pain that gets worse  · Fluid leaking from the skin (pus)  · Fever higher of 100.4º F (38.0º C) or higher after 2 days  on antibiotics  Date Last Reviewed: 9/1/2016  © 3997-7624 [employer]. [address] [place] [address], [place] [address]. All rights reserved. This information is not intended as a substitute for professional medical care. Always follow your healthcare professional's instructions.

## 2018-03-03 ENCOUNTER — HOSPITAL ENCOUNTER (EMERGENCY)
Facility: OTHER | Age: 58
Discharge: ANOTHER HOSPITAL | End: 2018-03-03
Attending: EMERGENCY MEDICINE
Payer: COMMERCIAL

## 2018-03-03 ENCOUNTER — HOSPITAL ENCOUNTER (INPATIENT)
Facility: HOSPITAL | Age: 58
LOS: 1 days | Discharge: HOME | DRG: 554 | End: 2018-03-04
Attending: INTERNAL MEDICINE | Admitting: INTERNAL MEDICINE
Payer: COMMERCIAL

## 2018-03-03 VITALS
RESPIRATION RATE: 18 BRPM | HEART RATE: 95 BPM | TEMPERATURE: 98 F | SYSTOLIC BLOOD PRESSURE: 151 MMHG | DIASTOLIC BLOOD PRESSURE: 82 MMHG | WEIGHT: 193.19 LBS | HEIGHT: 69 IN | BODY MASS INDEX: 28.61 KG/M2 | OXYGEN SATURATION: 98 %

## 2018-03-03 DIAGNOSIS — E87.6 HYPOKALEMIA: ICD-10-CM

## 2018-03-03 DIAGNOSIS — R00.0 TACHYCARDIA: ICD-10-CM

## 2018-03-03 DIAGNOSIS — L03.114 CELLULITIS OF LEFT ARM: Primary | ICD-10-CM

## 2018-03-03 DIAGNOSIS — L03.114 CELLULITIS OF ARM, LEFT: ICD-10-CM

## 2018-03-03 PROBLEM — E11.628 TYPE 2 DIABETES MELLITUS WITH SKIN COMPLICATION: Status: ACTIVE | Noted: 2018-03-03

## 2018-03-03 PROBLEM — I10 BENIGN ESSENTIAL HTN: Status: ACTIVE | Noted: 2018-03-03

## 2018-03-03 PROBLEM — M10.9 ACUTE GOUT: Status: ACTIVE | Noted: 2018-03-03

## 2018-03-03 LAB
ALBUMIN SERPL-MCNC: 4.5 G/DL (ref 3.3–5.5)
ALP SERPL-CCNC: 95 U/L (ref 42–141)
BILIRUB SERPL-MCNC: 1.7 MG/DL (ref 0.2–1.6)
BILIRUBIN, POC UA: NEGATIVE
BLOOD, POC UA: ABNORMAL
BUN SERPL-MCNC: 9 MG/DL (ref 7–22)
CALCIUM SERPL-MCNC: 10 MG/DL (ref 8–10.3)
CHLORIDE SERPL-SCNC: 97 MMOL/L (ref 98–108)
CLARITY, POC UA: CLEAR
COLOR, POC UA: YELLOW
CREAT SERPL-MCNC: 1.4 MG/DL (ref 0.6–1.2)
CRP SERPL-MCNC: 149.4 MG/L
ERYTHROCYTE [SEDIMENTATION RATE] IN BLOOD BY WESTERGREN METHOD: 31 MM/HR
GLUCOSE SERPL-MCNC: 131 MG/DL (ref 70–110)
GLUCOSE SERPL-MCNC: 144 MG/DL (ref 73–118)
GLUCOSE, POC UA: NEGATIVE
HCO3 UR-SCNC: 26.6 MMOL/L (ref 24–28)
HCO3 UR-SCNC: 28.3 MMOL/L (ref 24–28)
KETONES, POC UA: NEGATIVE
LDH SERPL L TO P-CCNC: 0.86 MMOL/L (ref 0.5–2.2)
LDH SERPL L TO P-CCNC: 1.11 MMOL/L (ref 0.5–2.2)
LEUKOCYTE EST, POC UA: NEGATIVE
NITRITE, POC UA: NEGATIVE
PCO2 BLDA: 44.4 MMHG (ref 35–45)
PCO2 BLDA: 47.1 MMHG (ref 35–45)
PH SMN: 7.36 [PH] (ref 7.35–7.45)
PH SMN: 7.41 [PH] (ref 7.35–7.45)
PH UR STRIP: 6 [PH]
PO2 BLDA: 25 MMHG (ref 40–60)
PO2 BLDA: 27 MMHG (ref 40–60)
POC ALT (SGPT): 30 U/L (ref 10–47)
POC AST (SGOT): 32 U/L (ref 11–38)
POC B-TYPE NATRIURETIC PEPTIDE: 7.2 PG/ML (ref 0–100)
POC BE: 1 MMOL/L
POC BE: 4 MMOL/L
POC CARDIAC TROPONIN I: 0 NG/ML
POC PTINR: 1.1 (ref 0.9–1.2)
POC PTWBT: 13.2 SEC (ref 9.7–14.3)
POC SATURATED O2: 41 % (ref 95–100)
POC SATURATED O2: 52 % (ref 95–100)
POC TCO2: 25 MMOL/L (ref 18–33)
POC TCO2: 28 MMOL/L (ref 24–29)
POC TCO2: 30 MMOL/L (ref 24–29)
POTASSIUM BLD-SCNC: 3.4 MMOL/L (ref 3.6–5.1)
PROTEIN, POC UA: NEGATIVE
PROTEIN, POC: 9.1 G/DL (ref 6.4–8.1)
SAMPLE: ABNORMAL
SAMPLE: ABNORMAL
SAMPLE: NORMAL
SAMPLE: NORMAL
SODIUM BLD-SCNC: 141 MMOL/L (ref 128–145)
SPECIFIC GRAVITY, POC UA: <=1.005
URATE SERPL-MCNC: 2.7 MG/DL
UROBILINOGEN, POC UA: 0.2 E.U./DL

## 2018-03-03 PROCEDURE — 85651 RBC SED RATE NONAUTOMATED: CPT

## 2018-03-03 PROCEDURE — 84550 ASSAY OF BLOOD/URIC ACID: CPT

## 2018-03-03 PROCEDURE — 87086 URINE CULTURE/COLONY COUNT: CPT

## 2018-03-03 PROCEDURE — 86140 C-REACTIVE PROTEIN: CPT

## 2018-03-03 PROCEDURE — 96367 TX/PROPH/DG ADDL SEQ IV INF: CPT

## 2018-03-03 PROCEDURE — 85610 PROTHROMBIN TIME: CPT

## 2018-03-03 PROCEDURE — 25000003 PHARM REV CODE 250: Performed by: EMERGENCY MEDICINE

## 2018-03-03 PROCEDURE — 83880 ASSAY OF NATRIURETIC PEPTIDE: CPT

## 2018-03-03 PROCEDURE — 93010 ELECTROCARDIOGRAM REPORT: CPT | Mod: ,,, | Performed by: INTERNAL MEDICINE

## 2018-03-03 PROCEDURE — 84484 ASSAY OF TROPONIN QUANT: CPT

## 2018-03-03 PROCEDURE — 36415 COLL VENOUS BLD VENIPUNCTURE: CPT

## 2018-03-03 PROCEDURE — 87040 BLOOD CULTURE FOR BACTERIA: CPT | Mod: 59

## 2018-03-03 PROCEDURE — 99285 EMERGENCY DEPT VISIT HI MDM: CPT | Mod: 25

## 2018-03-03 PROCEDURE — 82803 BLOOD GASES ANY COMBINATION: CPT

## 2018-03-03 PROCEDURE — 63600175 PHARM REV CODE 636 W HCPCS: Performed by: NURSE PRACTITIONER

## 2018-03-03 PROCEDURE — 96375 TX/PRO/DX INJ NEW DRUG ADDON: CPT

## 2018-03-03 PROCEDURE — 84145 PROCALCITONIN (PCT): CPT

## 2018-03-03 PROCEDURE — 83036 HEMOGLOBIN GLYCOSYLATED A1C: CPT

## 2018-03-03 PROCEDURE — 86038 ANTINUCLEAR ANTIBODIES: CPT

## 2018-03-03 PROCEDURE — 93005 ELECTROCARDIOGRAM TRACING: CPT

## 2018-03-03 PROCEDURE — 96361 HYDRATE IV INFUSION ADD-ON: CPT

## 2018-03-03 PROCEDURE — 25000003 PHARM REV CODE 250: Performed by: NURSE PRACTITIONER

## 2018-03-03 PROCEDURE — 96365 THER/PROPH/DIAG IV INF INIT: CPT

## 2018-03-03 PROCEDURE — 63600175 PHARM REV CODE 636 W HCPCS: Performed by: EMERGENCY MEDICINE

## 2018-03-03 PROCEDURE — 80053 COMPREHEN METABOLIC PANEL: CPT

## 2018-03-03 PROCEDURE — 81003 URINALYSIS AUTO W/O SCOPE: CPT

## 2018-03-03 PROCEDURE — 11000001 HC ACUTE MED/SURG PRIVATE ROOM

## 2018-03-03 PROCEDURE — 85025 COMPLETE CBC W/AUTO DIFF WBC: CPT

## 2018-03-03 PROCEDURE — 25500020 PHARM REV CODE 255

## 2018-03-03 RX ORDER — SODIUM CHLORIDE 0.9 % (FLUSH) 0.9 %
5 SYRINGE (ML) INJECTION
Status: DISCONTINUED | OUTPATIENT
Start: 2018-03-03 | End: 2018-03-04 | Stop reason: HOSPADM

## 2018-03-03 RX ORDER — ENOXAPARIN SODIUM 100 MG/ML
40 INJECTION SUBCUTANEOUS EVERY 24 HOURS
Status: DISCONTINUED | OUTPATIENT
Start: 2018-03-03 | End: 2018-03-04 | Stop reason: HOSPADM

## 2018-03-03 RX ORDER — ONDANSETRON 2 MG/ML
4 INJECTION INTRAMUSCULAR; INTRAVENOUS EVERY 8 HOURS PRN
Status: DISCONTINUED | OUTPATIENT
Start: 2018-03-03 | End: 2018-03-04 | Stop reason: HOSPADM

## 2018-03-03 RX ORDER — RAMELTEON 8 MG/1
8 TABLET ORAL NIGHTLY PRN
Status: DISCONTINUED | OUTPATIENT
Start: 2018-03-03 | End: 2018-03-04 | Stop reason: HOSPADM

## 2018-03-03 RX ORDER — ACETAMINOPHEN 325 MG/1
650 TABLET ORAL EVERY 8 HOURS PRN
Status: DISCONTINUED | OUTPATIENT
Start: 2018-03-03 | End: 2018-03-04 | Stop reason: HOSPADM

## 2018-03-03 RX ORDER — CIPROFLOXACIN 2 MG/ML
400 INJECTION, SOLUTION INTRAVENOUS
Status: COMPLETED | OUTPATIENT
Start: 2018-03-03 | End: 2018-03-03

## 2018-03-03 RX ORDER — POTASSIUM CHLORIDE 20 MEQ/1
40 TABLET, EXTENDED RELEASE ORAL
Status: COMPLETED | OUTPATIENT
Start: 2018-03-03 | End: 2018-03-03

## 2018-03-03 RX ORDER — VANCOMYCIN HCL IN 5 % DEXTROSE 1G/250ML
1000 PLASTIC BAG, INJECTION (ML) INTRAVENOUS
Status: DISCONTINUED | OUTPATIENT
Start: 2018-03-03 | End: 2018-03-04

## 2018-03-03 RX ORDER — KETOROLAC TROMETHAMINE 30 MG/ML
15 INJECTION, SOLUTION INTRAMUSCULAR; INTRAVENOUS
Status: COMPLETED | OUTPATIENT
Start: 2018-03-03 | End: 2018-03-03

## 2018-03-03 RX ORDER — ALLOPURINOL 300 MG/1
300 TABLET ORAL DAILY
COMMUNITY

## 2018-03-03 RX ADMIN — ACETAMINOPHEN 650 MG: 325 TABLET ORAL at 11:03

## 2018-03-03 RX ADMIN — VANCOMYCIN HYDROCHLORIDE 1000 MG: 1 INJECTION, POWDER, LYOPHILIZED, FOR SOLUTION INTRAVENOUS at 11:03

## 2018-03-03 RX ADMIN — POTASSIUM CHLORIDE 40 MEQ: 20 TABLET, EXTENDED RELEASE ORAL at 12:03

## 2018-03-03 RX ADMIN — KETOROLAC TROMETHAMINE 15 MG: 30 INJECTION, SOLUTION INTRAMUSCULAR at 11:03

## 2018-03-03 RX ADMIN — ENOXAPARIN SODIUM 40 MG: 100 INJECTION SUBCUTANEOUS at 05:03

## 2018-03-03 RX ADMIN — PIPERACILLIN AND TAZOBACTAM 4.5 G: 4; .5 INJECTION, POWDER, LYOPHILIZED, FOR SOLUTION INTRAVENOUS; PARENTERAL at 01:03

## 2018-03-03 RX ADMIN — IOHEXOL 100 ML: 350 INJECTION, SOLUTION INTRAVENOUS at 12:03

## 2018-03-03 RX ADMIN — VANCOMYCIN HYDROCHLORIDE 1000 MG: 1 INJECTION, POWDER, LYOPHILIZED, FOR SOLUTION INTRAVENOUS at 09:03

## 2018-03-03 RX ADMIN — PIPERACILLIN AND TAZOBACTAM 4.5 G: 4; .5 INJECTION, POWDER, LYOPHILIZED, FOR SOLUTION INTRAVENOUS; PARENTERAL at 10:03

## 2018-03-03 RX ADMIN — SODIUM CHLORIDE 2628 ML: 0.9 INJECTION, SOLUTION INTRAVENOUS at 11:03

## 2018-03-03 RX ADMIN — CIPROFLOXACIN 400 MG: 2 INJECTION, SOLUTION INTRAVENOUS at 02:03

## 2018-03-03 NOTE — ASSESSMENT & PLAN NOTE
Cellulitis, fever at home with chills, tachycardia which has improved with pain control and ABX - unclear etiology however patient has HX of gout - CT consistent with swelling and edema without evidence of osteomyelitis. - It appears that it has not worsened since initial assessment as swelling, redness, and streaking remains inside original border markings. Patient feels that it swelling has increased since this morning but only minimally  -Discontinue oral clindamycin and Continue IV ABX for now  -supportive care for pain control  -ok to feed patient  -Neuro checks  -IV fluids  -Uric acid, sed rate, crp, son

## 2018-03-03 NOTE — SUBJECTIVE & OBJECTIVE
Past Medical History:   Diagnosis Date    Gout     Hyperlipidemia     Hypertension     Myocardial infarction        Past Surgical History:   Procedure Laterality Date    CARPAL TUNNEL RELEASE Bilateral     cervical fusion      X 3    LAMINECTOMY      ROTATOR CUFF REPAIR Left     X 2    SHOULDER SURGERY Left        Review of patient's allergies indicates:  No Known Allergies    Current Facility-Administered Medications on File Prior to Encounter   Medication    [COMPLETED] ciprofloxacin (CIPRO)400mg/200ml D5W IVPB 400 mg    [COMPLETED] ketorolac injection 15 mg    [COMPLETED] omnipaque 350 iohexol 350 mg iodine/mL    [COMPLETED] piperacillin-tazobactam 4.5 g in dextrose 5 % 100 mL IVPB (ready to mix system)    [COMPLETED] potassium chloride SA CR tablet 40 mEq    [COMPLETED] sodium chloride 0.9% bolus 2,628 mL    [COMPLETED] vancomycin 1 g in 0.9% sodium chloride 250 mL IVPB (ready to mix system)     Current Outpatient Prescriptions on File Prior to Encounter   Medication Sig    allopurinol (ZYLOPRIM) 300 MG tablet Take 300 mg by mouth once daily.    clindamycin (CLEOCIN) 300 MG capsule Take 1 capsule (300 mg total) by mouth every 6 (six) hours.    duloxetine (CYMBALTA) 60 MG capsule     gabapentin (NEURONTIN) 300 MG capsule     METFORMIN HCL (METFORMIN ORAL) Take by mouth once daily.    metoprolol succinate (TOPROL-XL) 100 MG 24 hr tablet     multivitamin capsule Take 1 capsule by mouth once daily.    rosuvastatin (CRESTOR) 20 MG tablet Take 20 mg by mouth once daily.    cetirizine (ZYRTEC) 5 MG tablet Take 5 mg by mouth once daily.    cholecalciferol, vitamin D3, (VITAMIN D3) 1,000 unit capsule Take 1,000 Units by mouth once daily.    colchicine 0.6 mg tablet Take 1 tablet (0.6 mg total) by mouth once daily.    omega-3 acid ethyl esters (LOVAZA) 1 gram capsule TAKE 2 PILLS TWICE DAILY     Family History     Problem Relation (Age of Onset)    Gout Father    Heart disease Father    Rheum  arthritis Mother, Sister        Social History Main Topics    Smoking status: Former Smoker     Packs/day: 1.00     Years: 10.00     Quit date: 10/1/1991    Smokeless tobacco: Never Used    Alcohol use Yes      Comment: Occasionally    Drug use: No    Sexual activity: Not on file     Review of Systems   Constitutional: Positive for chills and fever. Negative for appetite change and diaphoresis.   HENT: Negative for congestion, hearing loss, sore throat, tinnitus and trouble swallowing.    Eyes: Negative for photophobia, discharge, itching and visual disturbance.   Respiratory: Negative for apnea, cough, wheezing and stridor.    Cardiovascular: Negative for chest pain, palpitations and leg swelling.   Gastrointestinal: Negative for abdominal distention, abdominal pain, blood in stool, constipation, diarrhea and nausea.   Endocrine: Negative for polydipsia, polyphagia and polyuria.   Genitourinary: Positive for difficulty urinating. Negative for dysuria, flank pain and frequency.   Musculoskeletal: Positive for joint swelling and myalgias. Negative for arthralgias and neck stiffness.   Skin: Positive for rash. Negative for color change and wound.   Neurological: Positive for weakness. Negative for dizziness, tremors, seizures, light-headedness, numbness and headaches.   Hematological: Negative for adenopathy.   Psychiatric/Behavioral: Negative for hallucinations and self-injury.     Objective:     Vital Signs (Most Recent):  Temp: 98.2 °F (36.8 °C) (03/03/18 1607)  Pulse: 96 (03/03/18 1607)  Resp: 18 (03/03/18 1607)  BP: (!) 145/82 (03/03/18 1607)  SpO2: 99 % (03/03/18 1607) Vital Signs (24h Range):  Temp:  [98.1 °F (36.7 °C)-98.2 °F (36.8 °C)] 98.2 °F (36.8 °C)  Pulse:  [] 96  Resp:  [18] 18  SpO2:  [97 %-99 %] 99 %  BP: (141-169)/(82-98) 145/82     Weight: 90.3 kg (199 lb 1.2 oz)  Body mass index is 29.4 kg/m².    Physical Exam   Constitutional: He is oriented to person, place, and time. He appears  well-developed and well-nourished. He is cooperative.   HENT:   Head: Normocephalic and atraumatic.   Eyes: Conjunctivae, EOM and lids are normal. Pupils are equal, round, and reactive to light.   Neck: Full passive range of motion without pain. Neck supple. No JVD present. No edema present. No thyroid mass present.   Cardiovascular: S1 normal, S2 normal and intact distal pulses.    No murmur heard.  Abdominal: Soft. Bowel sounds are normal. He exhibits no distension and no abdominal bruit. There is no splenomegaly or hepatomegaly. There is no tenderness. There is no CVA tenderness.   Musculoskeletal: He exhibits edema and tenderness.   See photos under media   Lymphadenopathy:     He has no cervical adenopathy.     He has no axillary adenopathy.   Neurological: He is alert and oriented to person, place, and time. He has normal reflexes. He displays no tremor. He displays no seizure activity.   Skin: Skin is warm, dry and intact. Rash noted. There is erythema.   Psychiatric: He has a normal mood and affect. His speech is normal. Thought content normal. Cognition and memory are normal.         CRANIAL NERVES     CN III, IV, VI   Pupils are equal, round, and reactive to light.  Extraocular motions are normal.        Significant Labs:   Urine Studies:   Recent Labs  Lab 03/03/18  1318   COLORU Yellow   SPECGRAV <=1.005*   NITRITE Negative*   UROBILINOGEN 0.2   LEUKOCYTESUR Negative*       Significant Imaging: I have reviewed all pertinent imaging results/findings within the past 24 hours.

## 2018-03-03 NOTE — ED PROVIDER NOTES
Encounter Date: 3/3/2018       History     Chief Complaint   Patient presents with    Cellulitis     seen at urgent care yesterday, reports redness/swelling/pain increasing to left arm. compliant with meds     Turner Silva is a 57 y.o. male who presents to the Emergency Department with left arm pain and swelling getting much worse today.  Patient states he was started on by dates yesterday received clindamycin injections at the urgent care and has been taking clindamycin as prescribed.  Patient states he woke up with significantly worsening swelling and pain this morning.  Initially it was just the lower arm was red and painful now it's extends above the elbow.  Patient denies numbness and tingling in his fingers.  No weakness.  Increased pain with movement  Patient has history of diabetes, high blood pressure, high cholesterol, gout and myocardial infarction.          Review of patient's allergies indicates:  No Known Allergies  Past Medical History:   Diagnosis Date    Gout     Hyperlipidemia     Hypertension     Myocardial infarction      Past Surgical History:   Procedure Laterality Date    CARPAL TUNNEL RELEASE Bilateral     cervical fusion      X 3    LAMINECTOMY      ROTATOR CUFF REPAIR Left     X 2    SHOULDER SURGERY Left      Family History   Problem Relation Age of Onset    Rheum arthritis Mother     Heart disease Father     Gout Father     Rheum arthritis Sister      Social History   Substance Use Topics    Smoking status: Former Smoker     Packs/day: 1.00     Years: 10.00     Quit date: 10/1/1991    Smokeless tobacco: Never Used    Alcohol use Yes      Comment: Occasionally     Review of Systems   Constitutional: Positive for chills and fever.   HENT: Negative for sore throat.    Respiratory: Negative for shortness of breath.    Cardiovascular: Negative for chest pain.   Gastrointestinal: Negative for nausea.   Genitourinary: Negative for dysuria.   Musculoskeletal: Positive for  arthralgias, joint swelling and myalgias. Negative for back pain.   Skin: Negative for rash.   Neurological: Negative for weakness.   Hematological: Does not bruise/bleed easily.   All other systems reviewed and are negative.      Physical Exam     Initial Vitals [03/03/18 1042]   BP Pulse Resp Temp SpO2   (!) 143/91 (!) 112 18 98.1 °F (36.7 °C) 98 %      MAP       108.33         Physical Exam    Nursing note and vitals reviewed.  Constitutional: He appears well-developed and well-nourished. He is not diaphoretic. No distress.   HENT:   Head: Normocephalic and atraumatic.   Right Ear: External ear normal.   Left Ear: External ear normal.   Nose: Nose normal.   Mouth/Throat: Oropharynx is clear and moist.   Eyes: EOM are normal. Pupils are equal, round, and reactive to light. Right eye exhibits no discharge. Left eye exhibits no discharge.   Neck: Normal range of motion. Neck supple.   Cardiovascular: Regular rhythm, S1 normal, S2 normal, normal heart sounds and intact distal pulses. Tachycardia present.  Exam reveals no gallop and no friction rub.    No murmur heard.  Pulmonary/Chest: Breath sounds normal. No respiratory distress. He has no wheezes. He has no rhonchi. He has no rales. He exhibits no tenderness.   Abdominal: Soft. Bowel sounds are normal. He exhibits no distension and no mass. There is no tenderness. There is no rebound and no guarding.   Musculoskeletal: Normal range of motion. He exhibits edema and tenderness.   Neurological: He is alert and oriented to person, place, and time. He has normal strength and normal reflexes. No cranial nerve deficit or sensory deficit.   Skin: Skin is warm. Capillary refill takes less than 2 seconds. No rash noted. There is erythema. No pallor.        Psychiatric: He has a normal mood and affect.         ED Course   Critical Care  Date/Time: 3/3/2018 1:14 PM  Performed by: PAVEL ISABEL  Authorized by: PAVEL ISABEL   Direct patient critical care time: 10  minutes  Additional history critical care time: 10 minutes  Ordering / reviewing critical care time: 10 minutes  Documentation critical care time: 10 minutes  Consulting other physicians critical care time: 8 minutes  Total critical care time (exclusive of procedural time) : 48 minutes  Critical care was necessary to treat or prevent imminent or life-threatening deterioration of the following conditions: dehydration, sepsis and renal failure.  Critical care was time spent personally by me on the following activities: evaluation of patient's response to treatment, examination of patient, obtaining history from patient or surrogate, ordering and performing treatments and interventions, ordering and review of laboratory studies, ordering and review of radiographic studies, pulse oximetry, re-evaluation of patient's condition and review of old charts.        Labs Reviewed   POCT GLUCOSE - Abnormal; Notable for the following:        Result Value    POC Glucose 131 (*)     All other components within normal limits   ISTAT PROCEDURE - Abnormal; Notable for the following:     POC PO2 27 (*)     POC HCO3 28.3 (*)     POC SATURATED O2 52 (*)     POC TCO2 30 (*)     All other components within normal limits   POCT CMP - Abnormal; Notable for the following:     POC Chloride 97 (*)     POC Creatinine 1.4 (*)     POC Glucose 144 (*)     POC Potassium 3.4 (*)     Bilirubin 1.7 (*)     Protein 9.1 (*)     All other components within normal limits   CULTURE, BLOOD   CULTURE, BLOOD   CULTURE, URINE   TROPONIN ISTAT   POCT CBC   POCT URINALYSIS DIPSTICK (CULTURE IF INDICATED)   POCT CMP   POCT PROTIME-INR   POCT B-TYPE NATRIURETIC PEPTIDE (BNP)   POCT TROPONIN   ISTAT PROCEDURE   POCT B-TYPE NATRIURETIC PEPTIDE (BNP)     White blood cell count 18.7, hemoglobin 15.8, hematocrit 47 and platelets 26    EKG Readings: (Independently Interpreted)   Initial Reading: No STEMI. Rhythm: Sinus Tachycardia. Heart Rate: 101. Axis: Normal. Clinical  Impression: Sinus Tachycardia Other Impression: Sinus tachycardia otherwise normal EKG.  QTC normal at 422.  No ST elevation     Imaging Results          CT Arm (Humerus) With Contrast Left (Final result)  Result time 03/03/18 13:01:17    Final result by Justin Roa MD (03/03/18 13:01:17)                 Impression:        Extensive subcutaneous edema around the left elbow/ forearm. No fluid collections, fracture, or evidence of osteomyelitis. Further evaluation could be performed MRI, if indicated.      Electronically signed by: Justin Roa MD  Date:     03/03/18  Time:    13:01              Narrative:    Comparison: None.    Findings: Routine CT extremity protocol performed with the administration of 100 cc of Omnipaque 350 IV contrast. There is extensive subcutaneous soft tissue inflammation around the left elbow and forearm. No discrete fluid collections identified. No fracture or marrow replacement process. No gross evidence of joint effusion. Limited evaluation of the musculature, tendons, and ligaments secondary to modality and patient positioning. Further evaluation could be performed with MRI, if indicated. The main arteries of the elbow/forearm opacified. Probable small amount of dystrophic calcification/bursitis overlying the olecranon.                                              Medical decision making   Chief complaint: Worsening left arm pain, swelling, and redness.  Patient's been taking clindamycin with worsening infection.  Differential diagnosis: Cellulitis, sepsis, abscess, hyperglycemia, hypoglycemia, hyperkalemia, hypokalemia and osteomyelitis  Treatment in the ED Physical Exam, Toradol for pain, vancomycin, IV fluids, potassium, Cipro, Zosyn  Patient reports decreased pain after medication.   White blood cell count 18.7  Creatinine elevated at 1.4   CT shows Extensive subcutaneous edema around the left elbow/ forearm. No fluid collections, fracture, or evidence of osteomyelitis. Further  evaluation could be performed MRI, if indicated.  Discussed labs, and imaging results.    Patient is agreeable to transfer and admission at [facility].  Contacted [facility] and  spoke with Gin at 1:10.  Requested admission for cellulitis failing outpatient management.    Consultation with DR Francisco Stevens for admission.  Discussed patient's presentation, past medical history, physical exam, and ED course.  Also discussed vital signs and diagnosis is worsening cellulitis failing outpatient treatment.   At this time patient will be transferred & admitted.      At time of transfer patient is awake alert oriented ×4 speaking clearly in full sentences moving all 4 extremities with sensation intact        Clinical Impression:   The primary encounter diagnosis was Cellulitis of left arm. Diagnoses of Tachycardia and Hypokalemia were also pertinent to this visit.                           Linette Menon,   03/03/18 1326       Linette Menon, DO  03/03/18 1327       Linette Menon, DO  03/03/18 1548
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
## 2018-03-03 NOTE — ASSESSMENT & PLAN NOTE
Patient has history of gout but on physcial assessment there is less of elbow or joint involvement than other portion of the arm - unclear what phase in the process this represents as the patient reports his symptoms originally started mild about 1 week ago.   -urinc acid, sed rate, crp, son as above  -precalcitonin

## 2018-03-03 NOTE — ASSESSMENT & PLAN NOTE
Hold home oral hyperglycemic metformin - while hospitalized will use combined insulin therapy with basal and prandial insulin coverage, POCT glucose checks, hypoglycemic protocol and correction scale - HgA1c

## 2018-03-03 NOTE — ASSESSMENT & PLAN NOTE
Patient has history of gout but on physcial assessment there is less of elbow or joint involvement than other portion of the arm - unclear what phase in the process this represents as the patient reports his symptoms originally started mild about 1 week ago.   -urinc acid, sed rate, crp, son as above  -procalcitonin

## 2018-03-03 NOTE — ASSESSMENT & PLAN NOTE
Cellulitis with risk of severe infection 2/2 DMII ---- fever at home with chills, tachycardia which has improved with pain control and ABX - unclear etiology however patient has HX of gout - CT consistent with swelling and edema without evidence of osteomyelitis. - It appears that it has not worsened since initial assessment as swelling, redness, and streaking remains inside original border markings. Patient feels that it swelling has increased since this morning but only minimally  -Discontinue oral clindamycin I consider this failed outpatient therapy   -Addedum: was able to verify Zosyn, cipro, & loading dose of Vanc IV rec'd at 1030a EDMA   -Continue Vanc & Zosyn IV  -supportive care for pain control  -ok to feed patient  -Neuro checks  -IV fluids  -Uric acid, sed rate, crp, son

## 2018-03-04 VITALS
RESPIRATION RATE: 18 BRPM | WEIGHT: 199.06 LBS | OXYGEN SATURATION: 100 % | DIASTOLIC BLOOD PRESSURE: 80 MMHG | HEART RATE: 99 BPM | TEMPERATURE: 98 F | BODY MASS INDEX: 29.48 KG/M2 | HEIGHT: 69 IN | SYSTOLIC BLOOD PRESSURE: 139 MMHG

## 2018-03-04 PROBLEM — L03.114 CELLULITIS OF ARM, LEFT: Status: RESOLVED | Noted: 2018-03-03 | Resolved: 2018-03-04

## 2018-03-04 LAB
ALBUMIN SERPL BCP-MCNC: 3.6 G/DL
ALP SERPL-CCNC: 76 U/L
ALT SERPL W/O P-5'-P-CCNC: 23 U/L
ANION GAP SERPL CALC-SCNC: 11 MMOL/L
AST SERPL-CCNC: 21 U/L
BILIRUB SERPL-MCNC: 1.7 MG/DL
BUN SERPL-MCNC: 14 MG/DL
CALCIUM SERPL-MCNC: 9.8 MG/DL
CHLORIDE SERPL-SCNC: 102 MMOL/L
CO2 SERPL-SCNC: 24 MMOL/L
CREAT SERPL-MCNC: 1.2 MG/DL
EST. GFR  (AFRICAN AMERICAN): >60 ML/MIN/1.73 M^2
EST. GFR  (NON AFRICAN AMERICAN): >60 ML/MIN/1.73 M^2
ESTIMATED AVG GLUCOSE: 126 MG/DL
GLUCOSE SERPL-MCNC: 163 MG/DL
HBA1C MFR BLD HPLC: 6 %
MAGNESIUM SERPL-MCNC: 2.1 MG/DL
POTASSIUM SERPL-SCNC: 3.8 MMOL/L
PROCALCITONIN SERPL IA-MCNC: 0.13 NG/ML
PROT SERPL-MCNC: 7.5 G/DL
SODIUM SERPL-SCNC: 137 MMOL/L

## 2018-03-04 PROCEDURE — 63600175 PHARM REV CODE 636 W HCPCS: Performed by: INTERNAL MEDICINE

## 2018-03-04 PROCEDURE — 36415 COLL VENOUS BLD VENIPUNCTURE: CPT

## 2018-03-04 PROCEDURE — 63600175 PHARM REV CODE 636 W HCPCS: Performed by: NURSE PRACTITIONER

## 2018-03-04 PROCEDURE — 83735 ASSAY OF MAGNESIUM: CPT

## 2018-03-04 PROCEDURE — 25000003 PHARM REV CODE 250: Performed by: NURSE PRACTITIONER

## 2018-03-04 PROCEDURE — 25000003 PHARM REV CODE 250: Performed by: INTERNAL MEDICINE

## 2018-03-04 PROCEDURE — 80053 COMPREHEN METABOLIC PANEL: CPT

## 2018-03-04 RX ORDER — VANCOMYCIN HCL IN 5 % DEXTROSE 1G/250ML
1000 PLASTIC BAG, INJECTION (ML) INTRAVENOUS
Status: DISCONTINUED | OUTPATIENT
Start: 2018-03-04 | End: 2018-03-04 | Stop reason: HOSPADM

## 2018-03-04 RX ADMIN — ACETAMINOPHEN 650 MG: 325 TABLET ORAL at 07:03

## 2018-03-04 RX ADMIN — VANCOMYCIN HYDROCHLORIDE 1000 MG: 1 INJECTION, POWDER, LYOPHILIZED, FOR SOLUTION INTRAVENOUS at 10:03

## 2018-03-04 RX ADMIN — PIPERACILLIN AND TAZOBACTAM 4.5 G: 4; .5 INJECTION, POWDER, LYOPHILIZED, FOR SOLUTION INTRAVENOUS; PARENTERAL at 05:03

## 2018-03-04 RX ADMIN — ENOXAPARIN SODIUM 40 MG: 100 INJECTION SUBCUTANEOUS at 04:03

## 2018-03-04 RX ADMIN — VANCOMYCIN HYDROCHLORIDE 1000 MG: 1 INJECTION, POWDER, LYOPHILIZED, FOR SOLUTION INTRAVENOUS at 06:03

## 2018-03-04 RX ADMIN — PIPERACILLIN AND TAZOBACTAM 4.5 G: 4; .5 INJECTION, POWDER, LYOPHILIZED, FOR SOLUTION INTRAVENOUS; PARENTERAL at 02:03

## 2018-03-04 NOTE — PLAN OF CARE
Problem: Patient Care Overview  Goal: Plan of Care Review  Outcome: Ongoing (interventions implemented as appropriate)  Pt progressing. Oriented X4. Voiding well. Skin integrity maintained. Left arm with redness and swelling noted and elevated on pillows. Patient complains of headache. Tylenol administered. Vs stable. Adequate oral intake. Tolerating diet and Iv antibiotics. Free of falls. Call light within reach. Bed in low position. No issues during shift. Continue plan of care.

## 2018-03-04 NOTE — PLAN OF CARE
"   03/04/18 0951   Final Note   Assessment Type Final Discharge Note   Discharge Disposition Home   What phone number can be called within the next 1-3 days to see how you are doing after discharge? ([phone])   Hospital Follow Up  Appt(s) scheduled? Yes   Discharge plans and expectations educations in teach back method with documentation complete? Yes   Right Care Referral Info   Post Acute Recommendation No Care   EDUCATION:  Mr Silva provided with educational information on Cellulitis.  Information reviewed and placed in :My Healthcare Packet" to be brought home for patient to use as resource after discharge.  Information included:  signs and symptoms to look for and call the doctor if experiencing, and symptoms that may indicate a medical emergency: CALL 911.    Reminded Mr Silva things he will be responsible for to manage his healthcare at home: getting Rx filled, attending follow up appointments, and taking medication as prescribed were discussed.   Teach back method used.  All questions answered.  Patient verbalized understanding of all information.      Nurse notified that all CM needs are met      "

## 2018-03-04 NOTE — SUBJECTIVE & OBJECTIVE
Interval History: No new issues. States swelling, redness is much improved.     Review of Systems   Constitutional: Negative for activity change, chills, diaphoresis and fever.   HENT: Negative for congestion.    Respiratory: Negative for chest tightness and shortness of breath.    Cardiovascular: Negative for chest pain.   Gastrointestinal: Negative for abdominal pain.   Genitourinary: Negative for difficulty urinating.     Objective:     Vital Signs (Most Recent):  Temp: 98.3 °F (36.8 °C) (03/04/18 0821)  Pulse: 90 (03/04/18 0821)  Resp: 18 (03/04/18 0821)  BP: (!) 157/76 (03/04/18 0821)  SpO2: 99 % (03/04/18 0821) Vital Signs (24h Range):  Temp:  [98.1 °F (36.7 °C)-99.5 °F (37.5 °C)] 98.3 °F (36.8 °C)  Pulse:  [] 90  Resp:  [18] 18  SpO2:  [97 %-99 %] 99 %  BP: (128-176)/(58-98) 157/76     Weight: 90.3 kg (199 lb 1.2 oz)  Body mass index is 29.4 kg/m².    Intake/Output Summary (Last 24 hours) at 03/04/18 1029  Last data filed at 03/04/18 0805   Gross per 24 hour   Intake              320 ml   Output                0 ml   Net              320 ml      Physical Exam   Constitutional: He is oriented to person, place, and time. He appears well-developed and well-nourished.   HENT:   Head: Atraumatic.   Neurological: He is alert and oriented to person, place, and time.   Skin: Skin is warm and dry.   Minimal redness to left arm.     Psychiatric: He has a normal mood and affect. His behavior is normal.   Vitals reviewed.      Significant Labs:   CBC: No results for input(s): WBC, HGB, HCT, PLT in the last 48 hours.  CMP:   Recent Labs  Lab 03/04/18  0430      K 3.8      CO2 24   *   BUN 14   CREATININE 1.2   CALCIUM 9.8   PROT 7.5   ALBUMIN 3.6   BILITOT 1.7*   ALKPHOS 76   AST 21   ALT 23   ANIONGAP 11   EGFRNONAA >60       Significant Imaging:

## 2018-03-04 NOTE — PLAN OF CARE
"TN to patient's room to discuss Helping the patient manage care at home.   TN/SW role explained to pt.     "Discharge planning begins on Admission" pamphlet discussed and placed in "My Health Packet" and placed at bedside.     Preferred Appointment time:  mornings  Preferred pharmacy:   [id number]  [address]  [address] [facility] [place], [place] [address] AT [facility] [id number] [facility] [place] [address]  Phone: [phone] Fax: [phone]    [id number] [place] - [address]  [address]  [place] [address]  Phone: [phone] Fax: [phone]       03/04/18 0946   Discharge Assessment   Assessment Type Discharge Planning Assessment   Confirmed/corrected address and phone number on facesheet? Yes   Assessment information obtained from? Patient   Expected Length of Stay (days) 3   Communicated expected length of stay with patient/caregiver yes   Prior to hospitilization cognitive status: Alert/Oriented   Prior to hospitalization functional status: Independent   Current cognitive status: Alert/Oriented   Current Functional Status: Independent   Lives With spouse;child(arjun), adult;grandchild(arjun)   Able to Return to Prior Arrangements yes   Is patient able to care for self after discharge? Yes   Patient's perception of discharge disposition home or selfcare   Readmission Within The Last 30 Days no previous admission in last 30 days   Patient currently being followed by outpatient case management? No   Patient currently receives any other outside agency services? No   Do you have any problems affording any of your prescribed medications? No   Is the patient taking medications as prescribed? yes   Does the patient have transportation home? Yes   Transportation Available family or friend will provide   Does the patient receive services at the Coumadin Clinic? No   Discharge Plan A Home with family   Discharge Plan B (tbd)   Patient/Family In Agreement With Plan yes     "

## 2018-03-04 NOTE — PROGRESS NOTES
[facility]    WRITTEN HEALTHCARE AND DISCHARGE INFORMATION     Follow-up Information     Mari Elena MD. Schedule an appointment as soon as possible for a visit in 1 week.    Specialty:  Internal Medicine  Contact information:  [address] [place] [address] [phone]                                      Help at Home           [phone]  After discharge for assistance [facility] On Call Nurse Care Line 24/7  Assistance    Things You are responsible For To Manage Your Care At Home:  1.    Getting your prescriptions filled   2.    Taking your medications as directed, DO NOT MISS ANY DOSES!  3.    Going to your follow-up doctor appointment. This is important because it  allow the doctor to monitor your progress and determine if  any changes need to made to your treatment plan.     Thank you for choosing [facility] for your care.  Please answer any calls you may receive from [facility] we want to continue to support you as you manage your healthcare needs. [facility] is happy to have the opportunity to serve you.     Sincerely,  Your [facility] Team,  SUN Meza, ACM-RN; [phone]
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
## 2018-03-04 NOTE — HOSPITAL COURSE
The patient was admitted to the hospital for evaluation and treatment of L arm cellulitis. He was started on Abx and by the following morning noted much improvement.  His blood cultures were negative, and he had no fevers.  He was requesting to go home.  He was non-ill appearing.  He will continue to get his Vancomycin through out today- and will be discharged to home tonight after last dose at 7. The rest of the hospital course was unremarkable. The patient will continue on clindamycin that he received at urgent care just a couple of days ago.  Diet- low NA, ADA 1800 melissa diet. Follow up with PCP in one week

## 2018-03-05 LAB
ANA SER QL IF: NORMAL
BACTERIA UR CULT: NO GROWTH

## 2018-03-05 NOTE — NURSING
Last dose of vanc infusing at this time.   Discharge instructions given to patient both written and verbal.   Patient voiced understanding of instructions.

## 2018-03-05 NOTE — PLAN OF CARE
Problem: Patient Care Overview  Goal: Plan of Care Review  Outcome: Ongoing (interventions implemented as appropriate)  Patient's left arm with less redness,  And swelling.  Patient to be discharged after 7pm vanc dose tonight.

## 2018-03-05 NOTE — PROGRESS NOTES
Patient verbalizes understanding of discharge instructions. Last dose of vancomycin completed and tolerated well. Iv removed to right forearm. Patient transported to private vehicle per wheelchair. No acute distress noted.
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
## 2018-03-08 LAB
BACTERIA BLD CULT: NORMAL
BACTERIA BLD CULT: NORMAL
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
## 2021-02-11 ENCOUNTER — OFFICE VISIT (OUTPATIENT)
Dept: URGENT CARE | Facility: CLINIC | Age: 61
End: 2021-02-11
Payer: COMMERCIAL

## 2021-02-11 VITALS
DIASTOLIC BLOOD PRESSURE: 72 MMHG | WEIGHT: 199 LBS | SYSTOLIC BLOOD PRESSURE: 116 MMHG | HEART RATE: 72 BPM | OXYGEN SATURATION: 97 % | BODY MASS INDEX: 29.39 KG/M2 | TEMPERATURE: 97 F

## 2021-02-11 DIAGNOSIS — U07.1 COVID-19 VIRUS INFECTION: Primary | ICD-10-CM

## 2021-02-11 DIAGNOSIS — Z20.822 COUGH WITH EXPOSURE TO COVID-19 VIRUS: ICD-10-CM

## 2021-02-11 DIAGNOSIS — R05.8 COUGH WITH EXPOSURE TO COVID-19 VIRUS: ICD-10-CM

## 2021-02-11 PROBLEM — M19.90 ARTHRITIS: Status: ACTIVE | Noted: 2021-02-11

## 2021-02-11 PROBLEM — I25.10 CORONARY ARTERY DISEASE INVOLVING NATIVE CORONARY ARTERY WITHOUT ANGINA PECTORIS: Status: ACTIVE | Noted: 2019-05-14

## 2021-02-11 PROBLEM — M48.062 SPINAL STENOSIS OF LUMBAR REGION WITH NEUROGENIC CLAUDICATION: Status: ACTIVE | Noted: 2019-12-20

## 2021-02-11 PROBLEM — R94.31 EKG, ABNORMAL: Status: ACTIVE | Noted: 2021-02-11

## 2021-02-11 PROBLEM — G47.33 OSA (OBSTRUCTIVE SLEEP APNEA): Status: ACTIVE | Noted: 2021-02-11

## 2021-02-11 LAB
CTP QC/QA: YES
SARS-COV-2 RDRP RESP QL NAA+PROBE: POSITIVE

## 2021-02-11 PROCEDURE — 3008F PR BODY MASS INDEX (BMI) DOCUMENTED: ICD-10-PCS | Mod: CPTII,S$GLB,, | Performed by: PHYSICIAN ASSISTANT

## 2021-02-11 PROCEDURE — U0002: ICD-10-PCS | Mod: QW,CR,S$GLB, | Performed by: PHYSICIAN ASSISTANT

## 2021-02-11 PROCEDURE — 99214 PR OFFICE/OUTPT VISIT, EST, LEVL IV, 30-39 MIN: ICD-10-PCS | Mod: S$GLB,CS,, | Performed by: PHYSICIAN ASSISTANT

## 2021-02-11 PROCEDURE — 99214 OFFICE O/P EST MOD 30 MIN: CPT | Mod: S$GLB,CS,, | Performed by: PHYSICIAN ASSISTANT

## 2021-02-11 PROCEDURE — U0002 COVID-19 LAB TEST NON-CDC: HCPCS | Mod: QW,CR,S$GLB, | Performed by: PHYSICIAN ASSISTANT

## 2021-02-11 PROCEDURE — 3008F BODY MASS INDEX DOCD: CPT | Mod: CPTII,S$GLB,, | Performed by: PHYSICIAN ASSISTANT

## 2021-02-11 RX ORDER — ONDANSETRON 4 MG/1
4 TABLET, ORALLY DISINTEGRATING ORAL EVERY 8 HOURS PRN
Qty: 15 TABLET | Refills: 0 | Status: SHIPPED | OUTPATIENT
Start: 2021-02-11 | End: 2021-07-22 | Stop reason: CLARIF

## 2021-02-11 RX ORDER — PROMETHAZINE HYDROCHLORIDE AND DEXTROMETHORPHAN HYDROBROMIDE 6.25; 15 MG/5ML; MG/5ML
5 SYRUP ORAL 3 TIMES DAILY PRN
Qty: 180 ML | Refills: 0 | Status: SHIPPED | OUTPATIENT
Start: 2021-02-11 | End: 2021-02-21

## 2021-02-11 RX ORDER — ALBUTEROL SULFATE 90 UG/1
2 AEROSOL, METERED RESPIRATORY (INHALATION) EVERY 4 HOURS PRN
Qty: 6.7 G | Refills: 0 | Status: SHIPPED | OUTPATIENT
Start: 2021-02-11 | End: 2021-07-22 | Stop reason: CLARIF

## 2021-02-11 RX ORDER — IPRATROPIUM BROMIDE 21 UG/1
2 SPRAY, METERED NASAL 2 TIMES DAILY
Qty: 30 ML | Refills: 0 | Status: SHIPPED | OUTPATIENT
Start: 2021-02-11 | End: 2021-07-02 | Stop reason: CLARIF

## 2021-02-12 ENCOUNTER — INFUSION (OUTPATIENT)
Dept: INFECTIOUS DISEASES | Facility: HOSPITAL | Age: 61
End: 2021-02-12
Attending: PHYSICIAN ASSISTANT
Payer: COMMERCIAL

## 2021-02-12 VITALS
RESPIRATION RATE: 19 BRPM | BODY MASS INDEX: 29.62 KG/M2 | SYSTOLIC BLOOD PRESSURE: 108 MMHG | HEART RATE: 70 BPM | TEMPERATURE: 99 F | HEIGHT: 69 IN | OXYGEN SATURATION: 96 % | WEIGHT: 200 LBS | DIASTOLIC BLOOD PRESSURE: 64 MMHG

## 2021-02-12 DIAGNOSIS — U07.1 COVID-19: Primary | ICD-10-CM

## 2021-02-12 PROCEDURE — M0239 BAMLANIVIMAB-XXXX INFUSION: HCPCS | Performed by: INTERNAL MEDICINE

## 2021-02-12 PROCEDURE — 63600175 PHARM REV CODE 636 W HCPCS: Performed by: INTERNAL MEDICINE

## 2021-02-12 PROCEDURE — 25000003 PHARM REV CODE 250: Performed by: INTERNAL MEDICINE

## 2021-02-12 RX ORDER — DIPHENHYDRAMINE HYDROCHLORIDE 50 MG/ML
25 INJECTION INTRAMUSCULAR; INTRAVENOUS ONCE AS NEEDED
Status: DISCONTINUED | OUTPATIENT
Start: 2021-02-12 | End: 2021-07-02 | Stop reason: CLARIF

## 2021-02-12 RX ORDER — SODIUM CHLORIDE 0.9 % (FLUSH) 0.9 %
10 SYRINGE (ML) INJECTION
Status: DISCONTINUED | OUTPATIENT
Start: 2021-02-12 | End: 2021-07-02 | Stop reason: CLARIF

## 2021-02-12 RX ORDER — ACETAMINOPHEN 325 MG/1
650 TABLET ORAL ONCE AS NEEDED
Status: ACTIVE | OUTPATIENT
Start: 2021-02-12 | End: 2032-07-11

## 2021-02-12 RX ORDER — ONDANSETRON 4 MG/1
4 TABLET, ORALLY DISINTEGRATING ORAL ONCE AS NEEDED
Status: DISCONTINUED | OUTPATIENT
Start: 2021-02-12 | End: 2021-07-02 | Stop reason: CLARIF

## 2021-02-12 RX ORDER — ALBUTEROL SULFATE 90 UG/1
2 AEROSOL, METERED RESPIRATORY (INHALATION)
Status: ACTIVE | OUTPATIENT
Start: 2021-02-12

## 2021-02-12 RX ORDER — EPINEPHRINE 0.1 MG/ML
0.3 INJECTION INTRAVENOUS
Status: DISCONTINUED | OUTPATIENT
Start: 2021-02-12 | End: 2021-07-02 | Stop reason: CLARIF

## 2021-02-12 RX ADMIN — SODIUM CHLORIDE 700 MG: 9 INJECTION, SOLUTION INTRAVENOUS at 01:02

## 2021-02-13 ENCOUNTER — NURSE TRIAGE (OUTPATIENT)
Dept: ADMINISTRATIVE | Facility: CLINIC | Age: 61
End: 2021-02-13

## 2021-02-15 ENCOUNTER — NURSE TRIAGE (OUTPATIENT)
Dept: ADMINISTRATIVE | Facility: CLINIC | Age: 61
End: 2021-02-15

## 2021-05-24 ENCOUNTER — TELEPHONE (OUTPATIENT)
Dept: NEUROSURGERY | Facility: CLINIC | Age: 61
End: 2021-05-24

## 2021-05-27 ENCOUNTER — TELEPHONE (OUTPATIENT)
Dept: NEUROSURGERY | Facility: CLINIC | Age: 61
End: 2021-05-27

## 2021-06-03 ENCOUNTER — OFFICE VISIT (OUTPATIENT)
Dept: NEUROSURGERY | Facility: CLINIC | Age: 61
End: 2021-06-03
Payer: COMMERCIAL

## 2021-06-03 VITALS — SYSTOLIC BLOOD PRESSURE: 139 MMHG | DIASTOLIC BLOOD PRESSURE: 79 MMHG | TEMPERATURE: 99 F | HEART RATE: 72 BPM

## 2021-06-03 DIAGNOSIS — Z01.818 ENCOUNTER FOR OTHER PREPROCEDURAL EXAMINATION: ICD-10-CM

## 2021-06-03 DIAGNOSIS — M54.12 CERVICAL RADICULOPATHY: ICD-10-CM

## 2021-06-03 DIAGNOSIS — M54.2 NECK PAIN: Primary | ICD-10-CM

## 2021-06-03 PROCEDURE — 1125F PR PAIN SEVERITY QUANTIFIED, PAIN PRESENT: ICD-10-PCS | Mod: S$GLB,,, | Performed by: NURSE PRACTITIONER

## 2021-06-03 PROCEDURE — 99204 PR OFFICE/OUTPT VISIT, NEW, LEVL IV, 45-59 MIN: ICD-10-PCS | Mod: S$GLB,,, | Performed by: NURSE PRACTITIONER

## 2021-06-03 PROCEDURE — 99999 PR PBB SHADOW E&M-EST. PATIENT-LVL III: CPT | Mod: PBBFAC,,, | Performed by: NURSE PRACTITIONER

## 2021-06-03 PROCEDURE — 99204 OFFICE O/P NEW MOD 45 MIN: CPT | Mod: S$GLB,,, | Performed by: NURSE PRACTITIONER

## 2021-06-03 PROCEDURE — 99999 PR PBB SHADOW E&M-EST. PATIENT-LVL III: ICD-10-PCS | Mod: PBBFAC,,, | Performed by: NURSE PRACTITIONER

## 2021-06-03 PROCEDURE — 1125F AMNT PAIN NOTED PAIN PRSNT: CPT | Mod: S$GLB,,, | Performed by: NURSE PRACTITIONER

## 2021-06-03 RX ORDER — IBUPROFEN 800 MG/1
800 TABLET ORAL 3 TIMES DAILY
Status: ON HOLD | COMMUNITY
Start: 2021-05-13 | End: 2021-08-04 | Stop reason: HOSPADM

## 2021-06-03 RX ORDER — TIZANIDINE HYDROCHLORIDE 2 MG/1
2 CAPSULE, GELATIN COATED ORAL
COMMUNITY
End: 2022-07-12

## 2021-06-03 RX ORDER — TADALAFIL 5 MG/1
5 TABLET ORAL DAILY
COMMUNITY
Start: 2021-05-06

## 2021-06-03 RX ORDER — BUPROPION HYDROCHLORIDE 300 MG/1
300 TABLET ORAL DAILY
COMMUNITY
Start: 2021-06-02

## 2021-06-03 RX ORDER — NAPROXEN SODIUM 220 MG/1
81 TABLET, FILM COATED ORAL
COMMUNITY

## 2021-06-04 ENCOUNTER — HOSPITAL ENCOUNTER (OUTPATIENT)
Dept: RADIOLOGY | Facility: HOSPITAL | Age: 61
Discharge: HOME | End: 2021-06-04
Attending: NURSE PRACTITIONER
Payer: COMMERCIAL

## 2021-06-04 DIAGNOSIS — M54.2 NECK PAIN: ICD-10-CM

## 2021-06-04 PROCEDURE — 72052 X-RAY EXAM NECK SPINE 6/>VWS: CPT | Mod: 26,,, | Performed by: RADIOLOGY

## 2021-06-04 PROCEDURE — 72052 XR CERVICAL SPINE 5 VIEW WITH FLEX AND EXT: ICD-10-PCS | Mod: 26,,, | Performed by: RADIOLOGY

## 2021-06-04 PROCEDURE — 72052 X-RAY EXAM NECK SPINE 6/>VWS: CPT | Mod: TC,FY,PO

## 2021-06-09 ENCOUNTER — HOSPITAL ENCOUNTER (OUTPATIENT)
Dept: RADIOLOGY | Facility: HOSPITAL | Age: 61
Discharge: HOME | End: 2021-06-09
Attending: NURSE PRACTITIONER
Payer: COMMERCIAL

## 2021-06-09 DIAGNOSIS — Z01.818 ENCOUNTER FOR OTHER PREPROCEDURAL EXAMINATION: ICD-10-CM

## 2021-06-09 DIAGNOSIS — M54.2 NECK PAIN: ICD-10-CM

## 2021-06-09 PROCEDURE — 72125 CT NECK SPINE W/O DYE: CPT | Mod: TC

## 2021-06-09 PROCEDURE — 72125 CT CERVICAL SPINE WITHOUT CONTRAST: ICD-10-PCS | Mod: 26,,, | Performed by: RADIOLOGY

## 2021-06-09 PROCEDURE — 72125 CT NECK SPINE W/O DYE: CPT | Mod: 26,,, | Performed by: RADIOLOGY

## 2021-06-15 ENCOUNTER — TELEPHONE (OUTPATIENT)
Dept: NEUROSURGERY | Facility: CLINIC | Age: 61
End: 2021-06-15

## 2021-06-16 ENCOUNTER — TELEPHONE (OUTPATIENT)
Dept: NEUROSURGERY | Facility: CLINIC | Age: 61
End: 2021-06-16

## 2021-06-23 ENCOUNTER — TELEPHONE (OUTPATIENT)
Dept: NEUROSURGERY | Facility: CLINIC | Age: 61
End: 2021-06-23

## 2021-06-30 ENCOUNTER — TELEPHONE (OUTPATIENT)
Dept: NEUROSURGERY | Facility: CLINIC | Age: 61
End: 2021-06-30

## 2021-07-01 ENCOUNTER — TELEPHONE (OUTPATIENT)
Dept: NEUROSURGERY | Facility: CLINIC | Age: 61
End: 2021-07-01

## 2021-07-01 ENCOUNTER — OFFICE VISIT (OUTPATIENT)
Dept: NEUROSURGERY | Facility: CLINIC | Age: 61
End: 2021-07-01
Payer: COMMERCIAL

## 2021-07-01 VITALS — SYSTOLIC BLOOD PRESSURE: 150 MMHG | DIASTOLIC BLOOD PRESSURE: 91 MMHG | HEART RATE: 67 BPM

## 2021-07-01 DIAGNOSIS — M54.12 CERVICAL RADICULOPATHY: Primary | ICD-10-CM

## 2021-07-01 DIAGNOSIS — M53.2X2 CERVICAL SPINE INSTABILITY: ICD-10-CM

## 2021-07-01 DIAGNOSIS — M47.12 CERVICAL SPONDYLOSIS WITH MYELOPATHY: ICD-10-CM

## 2021-07-01 PROCEDURE — 1125F PR PAIN SEVERITY QUANTIFIED, PAIN PRESENT: ICD-10-PCS | Mod: S$GLB,,, | Performed by: NEUROLOGICAL SURGERY

## 2021-07-01 PROCEDURE — 99999 PR PBB SHADOW E&M-EST. PATIENT-LVL III: ICD-10-PCS | Mod: PBBFAC,,, | Performed by: NEUROLOGICAL SURGERY

## 2021-07-01 PROCEDURE — 99214 OFFICE O/P EST MOD 30 MIN: CPT | Mod: S$GLB,,, | Performed by: NEUROLOGICAL SURGERY

## 2021-07-01 PROCEDURE — 1125F AMNT PAIN NOTED PAIN PRSNT: CPT | Mod: S$GLB,,, | Performed by: NEUROLOGICAL SURGERY

## 2021-07-01 PROCEDURE — 99214 PR OFFICE/OUTPT VISIT, EST, LEVL IV, 30-39 MIN: ICD-10-PCS | Mod: S$GLB,,, | Performed by: NEUROLOGICAL SURGERY

## 2021-07-01 PROCEDURE — 99999 PR PBB SHADOW E&M-EST. PATIENT-LVL III: CPT | Mod: PBBFAC,,, | Performed by: NEUROLOGICAL SURGERY

## 2021-07-02 DIAGNOSIS — Z01.818 PREOPERATIVE TESTING: Primary | ICD-10-CM

## 2021-07-02 RX ORDER — LISINOPRIL 10 MG/1
10 TABLET ORAL DAILY
COMMUNITY
End: 2021-09-16 | Stop reason: SDUPTHER

## 2021-07-02 RX ORDER — ALIROCUMAB 75 MG/ML
75 INJECTION, SOLUTION SUBCUTANEOUS
COMMUNITY

## 2021-07-06 ENCOUNTER — TELEPHONE (OUTPATIENT)
Dept: PREADMISSION TESTING | Facility: HOSPITAL | Age: 61
End: 2021-07-06

## 2021-07-19 ENCOUNTER — TELEPHONE (OUTPATIENT)
Dept: NEUROSURGERY | Facility: CLINIC | Age: 61
End: 2021-07-19

## 2021-07-19 DIAGNOSIS — Z01.818 PREOP TESTING: Primary | ICD-10-CM

## 2021-07-21 ENCOUNTER — TELEPHONE (OUTPATIENT)
Dept: NEUROSURGERY | Facility: CLINIC | Age: 61
End: 2021-07-21

## 2021-07-21 DIAGNOSIS — Z98.1 S/P CERVICAL SPINAL FUSION: Primary | ICD-10-CM

## 2021-07-22 ENCOUNTER — PATIENT MESSAGE (OUTPATIENT)
Dept: NEUROSURGERY | Facility: CLINIC | Age: 61
End: 2021-07-22

## 2021-07-22 ENCOUNTER — TELEPHONE (OUTPATIENT)
Dept: NEUROSURGERY | Facility: CLINIC | Age: 61
End: 2021-07-22

## 2021-07-22 ENCOUNTER — HOSPITAL ENCOUNTER (OUTPATIENT)
Dept: PREADMISSION TESTING | Facility: HOSPITAL | Age: 61
Discharge: HOME | End: 2021-07-22
Attending: NEUROLOGICAL SURGERY
Payer: COMMERCIAL

## 2021-07-22 ENCOUNTER — ANESTHESIA EVENT (OUTPATIENT)
Dept: SURGERY | Facility: HOSPITAL | Age: 61
End: 2021-07-22
Payer: COMMERCIAL

## 2021-07-22 VITALS
TEMPERATURE: 99 F | DIASTOLIC BLOOD PRESSURE: 78 MMHG | HEART RATE: 75 BPM | SYSTOLIC BLOOD PRESSURE: 157 MMHG | WEIGHT: 198 LBS | OXYGEN SATURATION: 95 % | BODY MASS INDEX: 29.33 KG/M2 | HEIGHT: 69 IN

## 2021-07-25 ENCOUNTER — LAB VISIT (OUTPATIENT)
Dept: URGENT CARE | Facility: CLINIC | Age: 61
End: 2021-07-25
Payer: COMMERCIAL

## 2021-07-25 DIAGNOSIS — Z01.818 PREOP TESTING: ICD-10-CM

## 2021-07-25 PROCEDURE — 99211 PR OFFICE/OUTPT VISIT, EST, LEVL I: ICD-10-PCS | Mod: S$GLB,CS,, | Performed by: NURSE PRACTITIONER

## 2021-07-25 PROCEDURE — 99211 OFF/OP EST MAY X REQ PHY/QHP: CPT | Mod: S$GLB,CS,, | Performed by: NURSE PRACTITIONER

## 2021-07-25 PROCEDURE — U0005 INFEC AGEN DETEC AMPLI PROBE: HCPCS | Performed by: NEUROLOGICAL SURGERY

## 2021-07-25 PROCEDURE — U0003 INFECTIOUS AGENT DETECTION BY NUCLEIC ACID (DNA OR RNA); SEVERE ACUTE RESPIRATORY SYNDROME CORONAVIRUS 2 (SARS-COV-2) (CORONAVIRUS DISEASE [COVID-19]), AMPLIFIED PROBE TECHNIQUE, MAKING USE OF HIGH THROUGHPUT TECHNOLOGIES AS DESCRIBED BY CMS-2020-01-R: HCPCS | Performed by: NEUROLOGICAL SURGERY

## 2021-07-26 ENCOUNTER — TELEPHONE (OUTPATIENT)
Dept: NEUROSURGERY | Facility: CLINIC | Age: 61
End: 2021-07-26

## 2021-07-26 ENCOUNTER — TELEPHONE (OUTPATIENT)
Dept: SPINE | Facility: CLINIC | Age: 61
End: 2021-07-26

## 2021-07-26 LAB
SARS-COV-2 RNA RESP QL NAA+PROBE: NOT DETECTED
SARS-COV-2- CYCLE NUMBER: -1

## 2021-07-27 ENCOUNTER — ANESTHESIA (OUTPATIENT)
Dept: SURGERY | Facility: HOSPITAL | Age: 61
End: 2021-07-27
Payer: COMMERCIAL

## 2021-07-27 ENCOUNTER — TELEPHONE (OUTPATIENT)
Dept: NEUROSURGERY | Facility: CLINIC | Age: 61
End: 2021-07-27

## 2021-07-27 DIAGNOSIS — Z01.818 PREOP TESTING: Primary | ICD-10-CM

## 2021-07-29 ENCOUNTER — TELEPHONE (OUTPATIENT)
Dept: NEUROSURGERY | Facility: CLINIC | Age: 61
End: 2021-07-29

## 2021-07-31 ENCOUNTER — LAB VISIT (OUTPATIENT)
Dept: SPORTS MEDICINE | Facility: CLINIC | Age: 61
End: 2021-07-31
Payer: COMMERCIAL

## 2021-07-31 DIAGNOSIS — Z01.818 PREOP TESTING: ICD-10-CM

## 2021-07-31 LAB — SARS-COV-2 RNA RESP QL NAA+PROBE: NOT DETECTED

## 2021-07-31 PROCEDURE — U0003 INFECTIOUS AGENT DETECTION BY NUCLEIC ACID (DNA OR RNA); SEVERE ACUTE RESPIRATORY SYNDROME CORONAVIRUS 2 (SARS-COV-2) (CORONAVIRUS DISEASE [COVID-19]), AMPLIFIED PROBE TECHNIQUE, MAKING USE OF HIGH THROUGHPUT TECHNOLOGIES AS DESCRIBED BY CMS-2020-01-R: HCPCS | Performed by: NEUROLOGICAL SURGERY

## 2021-07-31 PROCEDURE — U0005 INFEC AGEN DETEC AMPLI PROBE: HCPCS | Performed by: NEUROLOGICAL SURGERY

## 2021-08-02 ENCOUNTER — HOSPITAL ENCOUNTER (OUTPATIENT)
Facility: HOSPITAL | Age: 61
Discharge: HOME | End: 2021-08-04
Attending: NEUROLOGICAL SURGERY | Admitting: NEUROLOGICAL SURGERY
Payer: COMMERCIAL

## 2021-08-02 DIAGNOSIS — G95.9 CERVICAL MYELOPATHY: ICD-10-CM

## 2021-08-02 DIAGNOSIS — U07.1 COVID-19: ICD-10-CM

## 2021-08-02 LAB
POCT GLUCOSE: 138 MG/DL (ref 70–110)
POCT GLUCOSE: 139 MG/DL (ref 70–110)
POCT GLUCOSE: 162 MG/DL (ref 70–110)
POCT GLUCOSE: 169 MG/DL (ref 70–110)

## 2021-08-02 PROCEDURE — 71000039 HC RECOVERY, EACH ADD'L HOUR: Performed by: NEUROLOGICAL SURGERY

## 2021-08-02 PROCEDURE — 22840 PR POSTERIOR NON-SEGMENTAL INSTRUMENTATION: ICD-10-PCS | Mod: ,,, | Performed by: NEUROLOGICAL SURGERY

## 2021-08-02 PROCEDURE — 25000003 PHARM REV CODE 250: Performed by: STUDENT IN AN ORGANIZED HEALTH CARE EDUCATION/TRAINING PROGRAM

## 2021-08-02 PROCEDURE — 25000003 PHARM REV CODE 250: Performed by: NURSE ANESTHETIST, CERTIFIED REGISTERED

## 2021-08-02 PROCEDURE — C1729 CATH, DRAINAGE: HCPCS | Performed by: NEUROLOGICAL SURGERY

## 2021-08-02 PROCEDURE — 20930 PR ALLOGRAFT FOR SPINE SURGERY ONLY MORSELIZED: ICD-10-PCS | Mod: ,,, | Performed by: NEUROLOGICAL SURGERY

## 2021-08-02 PROCEDURE — D9220A PRA ANESTHESIA: ICD-10-PCS | Mod: ,,, | Performed by: SURGERY

## 2021-08-02 PROCEDURE — D9220A PRA ANESTHESIA: Mod: ,,, | Performed by: SURGERY

## 2021-08-02 PROCEDURE — 22840 INSERT SPINE FIXATION DEVICE: CPT | Mod: ,,, | Performed by: NEUROLOGICAL SURGERY

## 2021-08-02 PROCEDURE — 99900035 HC TECH TIME PER 15 MIN (STAT)

## 2021-08-02 PROCEDURE — 63600175 PHARM REV CODE 636 W HCPCS: Performed by: PHYSICIAN ASSISTANT

## 2021-08-02 PROCEDURE — D9220A PRA ANESTHESIA: ICD-10-PCS | Mod: ,,, | Performed by: NURSE ANESTHETIST, CERTIFIED REGISTERED

## 2021-08-02 PROCEDURE — 63600175 PHARM REV CODE 636 W HCPCS: Performed by: NURSE ANESTHETIST, CERTIFIED REGISTERED

## 2021-08-02 PROCEDURE — 37000009 HC ANESTHESIA EA ADD 15 MINS: Performed by: NEUROLOGICAL SURGERY

## 2021-08-02 PROCEDURE — 36000710: Performed by: NEUROLOGICAL SURGERY

## 2021-08-02 PROCEDURE — 25000003 PHARM REV CODE 250: Performed by: NEUROLOGICAL SURGERY

## 2021-08-02 PROCEDURE — 20930 SP BONE ALGRFT MORSEL ADD-ON: CPT | Mod: ,,, | Performed by: NEUROLOGICAL SURGERY

## 2021-08-02 PROCEDURE — 27800903 OPTIME MED/SURG SUP & DEVICES OTHER IMPLANTS: Performed by: NEUROLOGICAL SURGERY

## 2021-08-02 PROCEDURE — 22595 ARTHRD PST TQ ATLAS-AXIS: CPT | Mod: ,,, | Performed by: NEUROLOGICAL SURGERY

## 2021-08-02 PROCEDURE — 63600175 PHARM REV CODE 636 W HCPCS: Performed by: SURGERY

## 2021-08-02 PROCEDURE — 63600175 PHARM REV CODE 636 W HCPCS: Performed by: NEUROLOGICAL SURGERY

## 2021-08-02 PROCEDURE — 94761 N-INVAS EAR/PLS OXIMETRY MLT: CPT

## 2021-08-02 PROCEDURE — 27201423 OPTIME MED/SURG SUP & DEVICES STERILE SUPPLY: Performed by: NEUROLOGICAL SURGERY

## 2021-08-02 PROCEDURE — 25000003 PHARM REV CODE 250: Performed by: PHYSICIAN ASSISTANT

## 2021-08-02 PROCEDURE — 22595 PR ARTHRODESIS POSTERIOR ATLAS-AXIS C1-C2: ICD-10-PCS | Mod: ,,, | Performed by: NEUROLOGICAL SURGERY

## 2021-08-02 PROCEDURE — C1713 ANCHOR/SCREW BN/BN,TIS/BN: HCPCS | Performed by: NEUROLOGICAL SURGERY

## 2021-08-02 PROCEDURE — D9220A PRA ANESTHESIA: Mod: ,,, | Performed by: NURSE ANESTHETIST, CERTIFIED REGISTERED

## 2021-08-02 PROCEDURE — 82962 GLUCOSE BLOOD TEST: CPT | Performed by: NEUROLOGICAL SURGERY

## 2021-08-02 PROCEDURE — 36000711: Performed by: NEUROLOGICAL SURGERY

## 2021-08-02 PROCEDURE — 37000008 HC ANESTHESIA 1ST 15 MINUTES: Performed by: NEUROLOGICAL SURGERY

## 2021-08-02 PROCEDURE — 71000033 HC RECOVERY, INTIAL HOUR: Performed by: NEUROLOGICAL SURGERY

## 2021-08-02 DEVICE — ROD SYMPHONY PRE-CUT 3.5X25MM: Type: IMPLANTABLE DEVICE | Site: BACK | Status: FUNCTIONAL

## 2021-08-02 DEVICE — MATRIX BONE CELLR VIVIGEN 1CC: Type: IMPLANTABLE DEVICE | Site: BACK | Status: FUNCTIONAL

## 2021-08-02 DEVICE — SET SYMPHONY SCREW NS: Type: IMPLANTABLE DEVICE | Site: BACK | Status: FUNCTIONAL

## 2021-08-02 DEVICE — SCREW SYMPHONY PA 3.5X18MM: Type: IMPLANTABLE DEVICE | Site: BACK | Status: FUNCTIONAL

## 2021-08-02 DEVICE — IMPLANTABLE DEVICE: Type: IMPLANTABLE DEVICE | Site: BACK | Status: FUNCTIONAL

## 2021-08-02 RX ORDER — MUPIROCIN 20 MG/G
OINTMENT TOPICAL 2 TIMES DAILY
Status: DISCONTINUED | OUTPATIENT
Start: 2021-08-02 | End: 2021-08-04 | Stop reason: HOSPADM

## 2021-08-02 RX ORDER — IBUPROFEN 200 MG
16 TABLET ORAL
Status: DISCONTINUED | OUTPATIENT
Start: 2021-08-02 | End: 2021-08-04 | Stop reason: HOSPADM

## 2021-08-02 RX ORDER — HYDRALAZINE HYDROCHLORIDE 20 MG/ML
10 INJECTION INTRAMUSCULAR; INTRAVENOUS EVERY 6 HOURS PRN
Status: DISCONTINUED | OUTPATIENT
Start: 2021-08-02 | End: 2021-08-04 | Stop reason: HOSPADM

## 2021-08-02 RX ORDER — DULOXETIN HYDROCHLORIDE 30 MG/1
60 CAPSULE, DELAYED RELEASE ORAL DAILY
Status: DISCONTINUED | OUTPATIENT
Start: 2021-08-03 | End: 2021-08-04 | Stop reason: HOSPADM

## 2021-08-02 RX ORDER — LISINOPRIL 5 MG/1
10 TABLET ORAL DAILY
Status: DISCONTINUED | OUTPATIENT
Start: 2021-08-03 | End: 2021-08-04 | Stop reason: HOSPADM

## 2021-08-02 RX ORDER — HEPARIN SODIUM 5000 [USP'U]/ML
5000 INJECTION, SOLUTION INTRAVENOUS; SUBCUTANEOUS EVERY 8 HOURS
Status: DISCONTINUED | OUTPATIENT
Start: 2021-08-03 | End: 2021-08-04 | Stop reason: HOSPADM

## 2021-08-02 RX ORDER — BISACODYL 10 MG
10 SUPPOSITORY, RECTAL RECTAL DAILY PRN
Status: DISCONTINUED | OUTPATIENT
Start: 2021-08-02 | End: 2021-08-04 | Stop reason: HOSPADM

## 2021-08-02 RX ORDER — SODIUM CHLORIDE 9 MG/ML
INJECTION, SOLUTION INTRAVENOUS CONTINUOUS
Status: DISCONTINUED | OUTPATIENT
Start: 2021-08-02 | End: 2021-08-02

## 2021-08-02 RX ORDER — PREGABALIN 75 MG/1
75 CAPSULE ORAL ONCE
Status: COMPLETED | OUTPATIENT
Start: 2021-08-02 | End: 2021-08-02

## 2021-08-02 RX ORDER — LIDOCAINE HYDROCHLORIDE AND EPINEPHRINE 10; 10 MG/ML; UG/ML
INJECTION, SOLUTION INFILTRATION; PERINEURAL
Status: DISCONTINUED | OUTPATIENT
Start: 2021-08-02 | End: 2021-08-02 | Stop reason: HOSPADM

## 2021-08-02 RX ORDER — DIAZEPAM 5 MG/1
5 TABLET ORAL EVERY 6 HOURS PRN
Status: DISCONTINUED | OUTPATIENT
Start: 2021-08-02 | End: 2021-08-04 | Stop reason: HOSPADM

## 2021-08-02 RX ORDER — MORPHINE SULFATE 2 MG/ML
1 INJECTION, SOLUTION INTRAMUSCULAR; INTRAVENOUS EVERY 6 HOURS PRN
Status: DISCONTINUED | OUTPATIENT
Start: 2021-08-02 | End: 2021-08-04 | Stop reason: HOSPADM

## 2021-08-02 RX ORDER — FAMOTIDINE 10 MG/ML
INJECTION INTRAVENOUS
Status: DISCONTINUED | OUTPATIENT
Start: 2021-08-02 | End: 2021-08-02

## 2021-08-02 RX ORDER — GLUCAGON 1 MG
1 KIT INJECTION
Status: DISCONTINUED | OUTPATIENT
Start: 2021-08-02 | End: 2021-08-04 | Stop reason: HOSPADM

## 2021-08-02 RX ORDER — INSULIN ASPART 100 [IU]/ML
0-5 INJECTION, SOLUTION INTRAVENOUS; SUBCUTANEOUS
Status: DISCONTINUED | OUTPATIENT
Start: 2021-08-02 | End: 2021-08-04 | Stop reason: HOSPADM

## 2021-08-02 RX ORDER — ONDANSETRON 2 MG/ML
INJECTION INTRAMUSCULAR; INTRAVENOUS
Status: DISCONTINUED | OUTPATIENT
Start: 2021-08-02 | End: 2021-08-02

## 2021-08-02 RX ORDER — DEXAMETHASONE SODIUM PHOSPHATE 4 MG/ML
INJECTION, SOLUTION INTRA-ARTICULAR; INTRALESIONAL; INTRAMUSCULAR; INTRAVENOUS; SOFT TISSUE
Status: DISCONTINUED | OUTPATIENT
Start: 2021-08-02 | End: 2021-08-02

## 2021-08-02 RX ORDER — CARBOXYMETHYLCELLULOSE SODIUM 5 MG/ML
SOLUTION/ DROPS OPHTHALMIC
Status: DISCONTINUED | OUTPATIENT
Start: 2021-08-02 | End: 2021-08-02

## 2021-08-02 RX ORDER — AMOXICILLIN 250 MG
1 CAPSULE ORAL 2 TIMES DAILY
Status: DISCONTINUED | OUTPATIENT
Start: 2021-08-02 | End: 2021-08-04 | Stop reason: HOSPADM

## 2021-08-02 RX ORDER — MIDAZOLAM HYDROCHLORIDE 1 MG/ML
INJECTION, SOLUTION INTRAMUSCULAR; INTRAVENOUS
Status: DISCONTINUED | OUTPATIENT
Start: 2021-08-02 | End: 2021-08-02

## 2021-08-02 RX ORDER — FENTANYL CITRATE 50 UG/ML
INJECTION, SOLUTION INTRAMUSCULAR; INTRAVENOUS
Status: DISCONTINUED | OUTPATIENT
Start: 2021-08-02 | End: 2021-08-02

## 2021-08-02 RX ORDER — PHENYLEPHRINE HCL IN 0.9% NACL 1 MG/10 ML
SYRINGE (ML) INTRAVENOUS
Status: DISCONTINUED | OUTPATIENT
Start: 2021-08-02 | End: 2021-08-02

## 2021-08-02 RX ORDER — ACETAMINOPHEN 500 MG
1000 TABLET ORAL
Status: COMPLETED | OUTPATIENT
Start: 2021-08-02 | End: 2021-08-02

## 2021-08-02 RX ORDER — PROPOFOL 10 MG/ML
VIAL (ML) INTRAVENOUS CONTINUOUS PRN
Status: DISCONTINUED | OUTPATIENT
Start: 2021-08-02 | End: 2021-08-02

## 2021-08-02 RX ORDER — ROCURONIUM BROMIDE 10 MG/ML
INJECTION, SOLUTION INTRAVENOUS
Status: DISCONTINUED | OUTPATIENT
Start: 2021-08-02 | End: 2021-08-02

## 2021-08-02 RX ORDER — LIDOCAINE HYDROCHLORIDE 20 MG/ML
INJECTION, SOLUTION EPIDURAL; INFILTRATION; INTRACAUDAL; PERINEURAL
Status: DISCONTINUED | OUTPATIENT
Start: 2021-08-02 | End: 2021-08-02

## 2021-08-02 RX ORDER — EPHEDRINE SULFATE 50 MG/ML
INJECTION, SOLUTION INTRAVENOUS
Status: DISCONTINUED | OUTPATIENT
Start: 2021-08-02 | End: 2021-08-02

## 2021-08-02 RX ORDER — REMIFENTANIL HYDROCHLORIDE 1 MG/ML
INJECTION, POWDER, LYOPHILIZED, FOR SOLUTION INTRAVENOUS CONTINUOUS PRN
Status: DISCONTINUED | OUTPATIENT
Start: 2021-08-02 | End: 2021-08-02

## 2021-08-02 RX ORDER — SUCCINYLCHOLINE CHLORIDE 20 MG/ML
INJECTION INTRAMUSCULAR; INTRAVENOUS
Status: DISCONTINUED | OUTPATIENT
Start: 2021-08-02 | End: 2021-08-02

## 2021-08-02 RX ORDER — METOPROLOL SUCCINATE 50 MG/1
100 TABLET, EXTENDED RELEASE ORAL DAILY
Status: DISCONTINUED | OUTPATIENT
Start: 2021-08-02 | End: 2021-08-04 | Stop reason: HOSPADM

## 2021-08-02 RX ORDER — CEFAZOLIN SODIUM 1 G/3ML
1 INJECTION, POWDER, FOR SOLUTION INTRAMUSCULAR; INTRAVENOUS
Status: DISCONTINUED | OUTPATIENT
Start: 2021-08-02 | End: 2021-08-04 | Stop reason: HOSPADM

## 2021-08-02 RX ORDER — HYDROCODONE BITARTRATE AND ACETAMINOPHEN 5; 325 MG/1; MG/1
1 TABLET ORAL EVERY 4 HOURS PRN
Status: DISCONTINUED | OUTPATIENT
Start: 2021-08-02 | End: 2021-08-04 | Stop reason: HOSPADM

## 2021-08-02 RX ORDER — ACETAMINOPHEN 500 MG
500 TABLET ORAL
COMMUNITY

## 2021-08-02 RX ORDER — METHYLPREDNISOLONE ACETATE 40 MG/ML
INJECTION, SUSPENSION INTRA-ARTICULAR; INTRALESIONAL; INTRAMUSCULAR; SOFT TISSUE
Status: DISCONTINUED | OUTPATIENT
Start: 2021-08-02 | End: 2021-08-02 | Stop reason: HOSPADM

## 2021-08-02 RX ORDER — PROPOFOL 10 MG/ML
VIAL (ML) INTRAVENOUS
Status: DISCONTINUED | OUTPATIENT
Start: 2021-08-02 | End: 2021-08-02

## 2021-08-02 RX ORDER — CELECOXIB 200 MG/1
400 CAPSULE ORAL ONCE
Status: COMPLETED | OUTPATIENT
Start: 2021-08-02 | End: 2021-08-02

## 2021-08-02 RX ORDER — FENTANYL CITRATE 50 UG/ML
25 INJECTION, SOLUTION INTRAMUSCULAR; INTRAVENOUS EVERY 5 MIN PRN
Status: COMPLETED | OUTPATIENT
Start: 2021-08-02 | End: 2021-08-02

## 2021-08-02 RX ORDER — KETAMINE HCL IN 0.9 % NACL 50 MG/5 ML
SYRINGE (ML) INTRAVENOUS
Status: DISCONTINUED | OUTPATIENT
Start: 2021-08-02 | End: 2021-08-02

## 2021-08-02 RX ORDER — HYDROCODONE BITARTRATE AND ACETAMINOPHEN 10; 325 MG/1; MG/1
1 TABLET ORAL EVERY 4 HOURS PRN
Status: DISCONTINUED | OUTPATIENT
Start: 2021-08-02 | End: 2021-08-04 | Stop reason: HOSPADM

## 2021-08-02 RX ORDER — METHOCARBAMOL 750 MG/1
750 TABLET, FILM COATED ORAL
Status: COMPLETED | OUTPATIENT
Start: 2021-08-02 | End: 2021-08-02

## 2021-08-02 RX ORDER — ALLOPURINOL 300 MG/1
300 TABLET ORAL DAILY
Status: DISCONTINUED | OUTPATIENT
Start: 2021-08-03 | End: 2021-08-04 | Stop reason: HOSPADM

## 2021-08-02 RX ORDER — MUPIROCIN 20 MG/G
OINTMENT TOPICAL
Status: DISCONTINUED | OUTPATIENT
Start: 2021-08-02 | End: 2021-08-02 | Stop reason: HOSPADM

## 2021-08-02 RX ORDER — ONDANSETRON 2 MG/ML
4 INJECTION INTRAMUSCULAR; INTRAVENOUS DAILY PRN
Status: DISCONTINUED | OUTPATIENT
Start: 2021-08-02 | End: 2021-08-02 | Stop reason: HOSPADM

## 2021-08-02 RX ORDER — CEFAZOLIN SODIUM 1 G/3ML
INJECTION, POWDER, FOR SOLUTION INTRAMUSCULAR; INTRAVENOUS
Status: DISCONTINUED | OUTPATIENT
Start: 2021-08-02 | End: 2021-08-02

## 2021-08-02 RX ORDER — VANCOMYCIN HYDROCHLORIDE 1 G/20ML
INJECTION, POWDER, LYOPHILIZED, FOR SOLUTION INTRAVENOUS
Status: DISCONTINUED | OUTPATIENT
Start: 2021-08-02 | End: 2021-08-02 | Stop reason: HOSPADM

## 2021-08-02 RX ORDER — CEFAZOLIN SODIUM 1 G/3ML
2 INJECTION, POWDER, FOR SOLUTION INTRAMUSCULAR; INTRAVENOUS
Status: DISCONTINUED | OUTPATIENT
Start: 2021-08-02 | End: 2021-08-02

## 2021-08-02 RX ORDER — SODIUM CHLORIDE 0.9 % (FLUSH) 0.9 %
10 SYRINGE (ML) INJECTION
Status: DISCONTINUED | OUTPATIENT
Start: 2021-08-02 | End: 2021-08-02 | Stop reason: HOSPADM

## 2021-08-02 RX ORDER — IBUPROFEN 200 MG
24 TABLET ORAL
Status: DISCONTINUED | OUTPATIENT
Start: 2021-08-02 | End: 2021-08-04 | Stop reason: HOSPADM

## 2021-08-02 RX ORDER — ONDANSETRON 8 MG/1
8 TABLET, ORALLY DISINTEGRATING ORAL EVERY 6 HOURS PRN
Status: DISCONTINUED | OUTPATIENT
Start: 2021-08-02 | End: 2021-08-04 | Stop reason: HOSPADM

## 2021-08-02 RX ORDER — BUPROPION HYDROCHLORIDE 300 MG/1
300 TABLET ORAL DAILY
Status: DISCONTINUED | OUTPATIENT
Start: 2021-08-03 | End: 2021-08-04 | Stop reason: HOSPADM

## 2021-08-02 RX ORDER — ALBUTEROL SULFATE 90 UG/1
2 AEROSOL, METERED RESPIRATORY (INHALATION)
Status: DISCONTINUED | OUTPATIENT
Start: 2021-08-02 | End: 2021-08-04 | Stop reason: HOSPADM

## 2021-08-02 RX ORDER — PHENYLEPHRINE HYDROCHLORIDE 10 MG/ML
INJECTION INTRAVENOUS CONTINUOUS PRN
Status: DISCONTINUED | OUTPATIENT
Start: 2021-08-02 | End: 2021-08-02

## 2021-08-02 RX ORDER — NEOSTIGMINE METHYLSULFATE 1 MG/ML
INJECTION, SOLUTION INTRAVENOUS
Status: DISCONTINUED | OUTPATIENT
Start: 2021-08-02 | End: 2021-08-02

## 2021-08-02 RX ADMIN — DOCUSATE SODIUM 50MG AND SENNOSIDES 8.6MG 1 TABLET: 8.6; 5 TABLET, FILM COATED ORAL at 08:08

## 2021-08-02 RX ADMIN — PROPOFOL 200 MCG/KG/MIN: 10 INJECTION, EMULSION INTRAVENOUS at 10:08

## 2021-08-02 RX ADMIN — GLYCOPYRROLATE 0.4 MG: 0.2 INJECTION, SOLUTION INTRAMUSCULAR; INTRAVITREAL at 12:08

## 2021-08-02 RX ADMIN — ACETAMINOPHEN 1000 MG: 500 TABLET ORAL at 09:08

## 2021-08-02 RX ADMIN — CARBOXYMETHYLCELLULOSE SODIUM 2 DROP: 5 SOLUTION/ DROPS OPHTHALMIC at 11:08

## 2021-08-02 RX ADMIN — PREGABALIN 75 MG: 75 CAPSULE ORAL at 09:08

## 2021-08-02 RX ADMIN — FENTANYL CITRATE 50 MCG: 50 INJECTION INTRAMUSCULAR; INTRAVENOUS at 11:08

## 2021-08-02 RX ADMIN — HYDROCODONE BITARTRATE AND ACETAMINOPHEN 1 TABLET: 10; 325 TABLET ORAL at 06:08

## 2021-08-02 RX ADMIN — Medication 100 MCG: at 12:08

## 2021-08-02 RX ADMIN — MIDAZOLAM 2 MG: 1 INJECTION INTRAMUSCULAR; INTRAVENOUS at 10:08

## 2021-08-02 RX ADMIN — Medication 20 MG: at 11:08

## 2021-08-02 RX ADMIN — ONDANSETRON 4 MG: 2 INJECTION INTRAMUSCULAR; INTRAVENOUS at 01:08

## 2021-08-02 RX ADMIN — MUPIROCIN: 20 OINTMENT TOPICAL at 09:08

## 2021-08-02 RX ADMIN — FENTANYL CITRATE 25 MCG: 50 INJECTION INTRAMUSCULAR; INTRAVENOUS at 02:08

## 2021-08-02 RX ADMIN — PHENYLEPHRINE HYDROCHLORIDE 0.08 MCG/KG/MIN: 10 INJECTION INTRAVENOUS at 11:08

## 2021-08-02 RX ADMIN — CEFAZOLIN 2 G: 330 INJECTION, POWDER, FOR SOLUTION INTRAMUSCULAR; INTRAVENOUS at 11:08

## 2021-08-02 RX ADMIN — FAMOTIDINE 20 MG: 10 INJECTION INTRAVENOUS at 10:08

## 2021-08-02 RX ADMIN — METOPROLOL SUCCINATE 100 MG: 50 TABLET, EXTENDED RELEASE ORAL at 08:08

## 2021-08-02 RX ADMIN — EPHEDRINE SULFATE 10 MG: 50 INJECTION INTRAVENOUS at 11:08

## 2021-08-02 RX ADMIN — HYDROCODONE BITARTRATE AND ACETAMINOPHEN 1 TABLET: 10; 325 TABLET ORAL at 02:08

## 2021-08-02 RX ADMIN — SODIUM CHLORIDE: 9 INJECTION, SOLUTION INTRAVENOUS at 09:08

## 2021-08-02 RX ADMIN — EPHEDRINE SULFATE 5 MG: 50 INJECTION INTRAVENOUS at 12:08

## 2021-08-02 RX ADMIN — ROCURONIUM BROMIDE 30 MG: 10 INJECTION INTRAVENOUS at 11:08

## 2021-08-02 RX ADMIN — CEFAZOLIN 1 G: 330 INJECTION, POWDER, FOR SOLUTION INTRAMUSCULAR; INTRAVENOUS at 08:08

## 2021-08-02 RX ADMIN — ROCURONIUM BROMIDE 5 MG: 10 INJECTION INTRAVENOUS at 11:08

## 2021-08-02 RX ADMIN — METHOCARBAMOL 750 MG: 750 TABLET ORAL at 09:08

## 2021-08-02 RX ADMIN — SODIUM CHLORIDE: 0.9 INJECTION, SOLUTION INTRAVENOUS at 09:08

## 2021-08-02 RX ADMIN — LIDOCAINE HYDROCHLORIDE 100 MG: 20 INJECTION, SOLUTION EPIDURAL; INFILTRATION; INTRACAUDAL at 11:08

## 2021-08-02 RX ADMIN — DEXAMETHASONE SODIUM PHOSPHATE 8 MG: 4 INJECTION INTRA-ARTICULAR; INTRALESIONAL; INTRAMUSCULAR; INTRAVENOUS; SOFT TISSUE at 11:08

## 2021-08-02 RX ADMIN — Medication 50 MCG: at 11:08

## 2021-08-02 RX ADMIN — REMIFENTANIL HYDROCHLORIDE 0.2 MCG/KG/MIN: 1 INJECTION, POWDER, LYOPHILIZED, FOR SOLUTION INTRAVENOUS at 10:08

## 2021-08-02 RX ADMIN — SUCCINYLCHOLINE CHLORIDE 120 MG: 20 INJECTION, SOLUTION INTRAMUSCULAR; INTRAVENOUS; PARENTERAL at 11:08

## 2021-08-02 RX ADMIN — CELECOXIB 400 MG: 200 CAPSULE ORAL at 09:08

## 2021-08-02 RX ADMIN — FENTANYL CITRATE 100 MCG: 50 INJECTION INTRAMUSCULAR; INTRAVENOUS at 11:08

## 2021-08-02 RX ADMIN — Medication 100 MCG: at 11:08

## 2021-08-02 RX ADMIN — MUPIROCIN: 20 OINTMENT TOPICAL at 08:08

## 2021-08-02 RX ADMIN — NEOSTIGMINE METHYLSULFATE 4 MG: 1 INJECTION INTRAVENOUS at 12:08

## 2021-08-02 RX ADMIN — PROPOFOL 160 MG: 10 INJECTION, EMULSION INTRAVENOUS at 11:08

## 2021-08-02 RX ADMIN — HYDRALAZINE HYDROCHLORIDE 10 MG: 20 INJECTION INTRAMUSCULAR; INTRAVENOUS at 03:08

## 2021-08-03 LAB
BUN SERPL-MCNC: 22 MG/DL (ref 6–30)
CHLORIDE SERPL-SCNC: 98 MMOL/L (ref 95–110)
CREAT SERPL-MCNC: 1.2 MG/DL (ref 0.5–1.4)
GLUCOSE SERPL-MCNC: 159 MG/DL (ref 70–110)
HCT VFR BLD CALC: 41 %PCV (ref 36–54)
POC IONIZED CALCIUM: 1.12 MMOL/L (ref 1.06–1.42)
POC TCO2 (MEASURED): 24 MMOL/L (ref 23–29)
POCT GLUCOSE: 116 MG/DL (ref 70–110)
POCT GLUCOSE: 131 MG/DL (ref 70–110)
POCT GLUCOSE: 144 MG/DL (ref 70–110)
POCT GLUCOSE: 147 MG/DL (ref 70–110)
POTASSIUM BLD-SCNC: 4 MMOL/L (ref 3.5–5.1)
SAMPLE: ABNORMAL
SODIUM BLD-SCNC: 136 MMOL/L (ref 136–145)

## 2021-08-03 PROCEDURE — 84295 ASSAY OF SERUM SODIUM: CPT

## 2021-08-03 PROCEDURE — 97116 GAIT TRAINING THERAPY: CPT

## 2021-08-03 PROCEDURE — 51798 US URINE CAPACITY MEASURE: CPT

## 2021-08-03 PROCEDURE — 97535 SELF CARE MNGMENT TRAINING: CPT

## 2021-08-03 PROCEDURE — 85014 HEMATOCRIT: CPT

## 2021-08-03 PROCEDURE — 82330 ASSAY OF CALCIUM: CPT

## 2021-08-03 PROCEDURE — 99024 POSTOP FOLLOW-UP VISIT: CPT | Mod: ,,, | Performed by: PHYSICIAN ASSISTANT

## 2021-08-03 PROCEDURE — 63600175 PHARM REV CODE 636 W HCPCS: Performed by: PHYSICIAN ASSISTANT

## 2021-08-03 PROCEDURE — 99900035 HC TECH TIME PER 15 MIN (STAT)

## 2021-08-03 PROCEDURE — 97161 PT EVAL LOW COMPLEX 20 MIN: CPT

## 2021-08-03 PROCEDURE — 99024 PR POST-OP FOLLOW-UP VISIT: ICD-10-PCS | Mod: ,,, | Performed by: PHYSICIAN ASSISTANT

## 2021-08-03 PROCEDURE — 94799 UNLISTED PULMONARY SVC/PX: CPT

## 2021-08-03 PROCEDURE — 97165 OT EVAL LOW COMPLEX 30 MIN: CPT

## 2021-08-03 PROCEDURE — 84132 ASSAY OF SERUM POTASSIUM: CPT

## 2021-08-03 PROCEDURE — 25000003 PHARM REV CODE 250: Performed by: PHYSICIAN ASSISTANT

## 2021-08-03 PROCEDURE — 94761 N-INVAS EAR/PLS OXIMETRY MLT: CPT

## 2021-08-03 RX ADMIN — DULOXETINE 60 MG: 30 CAPSULE, DELAYED RELEASE ORAL at 08:08

## 2021-08-03 RX ADMIN — HYDROCODONE BITARTRATE AND ACETAMINOPHEN 1 TABLET: 5; 325 TABLET ORAL at 07:08

## 2021-08-03 RX ADMIN — HYDROCODONE BITARTRATE AND ACETAMINOPHEN 1 TABLET: 5; 325 TABLET ORAL at 05:08

## 2021-08-03 RX ADMIN — CEFAZOLIN 1 G: 330 INJECTION, POWDER, FOR SOLUTION INTRAMUSCULAR; INTRAVENOUS at 11:08

## 2021-08-03 RX ADMIN — CEFAZOLIN 1 G: 330 INJECTION, POWDER, FOR SOLUTION INTRAMUSCULAR; INTRAVENOUS at 03:08

## 2021-08-03 RX ADMIN — HYDROCODONE BITARTRATE AND ACETAMINOPHEN 1 TABLET: 10; 325 TABLET ORAL at 03:08

## 2021-08-03 RX ADMIN — HEPARIN SODIUM 5000 UNITS: 5000 INJECTION INTRAVENOUS; SUBCUTANEOUS at 06:08

## 2021-08-03 RX ADMIN — DOCUSATE SODIUM 50MG AND SENNOSIDES 8.6MG 1 TABLET: 8.6; 5 TABLET, FILM COATED ORAL at 08:08

## 2021-08-03 RX ADMIN — HEPARIN SODIUM 5000 UNITS: 5000 INJECTION INTRAVENOUS; SUBCUTANEOUS at 02:08

## 2021-08-03 RX ADMIN — METOPROLOL SUCCINATE 100 MG: 50 TABLET, EXTENDED RELEASE ORAL at 08:08

## 2021-08-03 RX ADMIN — DIAZEPAM 5 MG: 5 TABLET ORAL at 02:08

## 2021-08-03 RX ADMIN — CEFAZOLIN 1 G: 330 INJECTION, POWDER, FOR SOLUTION INTRAMUSCULAR; INTRAVENOUS at 08:08

## 2021-08-03 RX ADMIN — LISINOPRIL 10 MG: 5 TABLET ORAL at 08:08

## 2021-08-03 RX ADMIN — HYDROCODONE BITARTRATE AND ACETAMINOPHEN 1 TABLET: 10; 325 TABLET ORAL at 09:08

## 2021-08-03 RX ADMIN — MUPIROCIN: 20 OINTMENT TOPICAL at 08:08

## 2021-08-03 RX ADMIN — HYDROCODONE BITARTRATE AND ACETAMINOPHEN 1 TABLET: 5; 325 TABLET ORAL at 12:08

## 2021-08-03 RX ADMIN — HEPARIN SODIUM 5000 UNITS: 5000 INJECTION INTRAVENOUS; SUBCUTANEOUS at 09:08

## 2021-08-03 RX ADMIN — BUPROPION HYDROCHLORIDE 300 MG: 300 TABLET, FILM COATED, EXTENDED RELEASE ORAL at 08:08

## 2021-08-03 RX ADMIN — ALLOPURINOL 300 MG: 300 TABLET ORAL at 08:08

## 2021-08-04 VITALS
RESPIRATION RATE: 18 BRPM | SYSTOLIC BLOOD PRESSURE: 151 MMHG | HEIGHT: 69 IN | DIASTOLIC BLOOD PRESSURE: 78 MMHG | TEMPERATURE: 98 F | BODY MASS INDEX: 29.33 KG/M2 | OXYGEN SATURATION: 94 % | HEART RATE: 82 BPM | WEIGHT: 198 LBS

## 2021-08-04 LAB — POCT GLUCOSE: 108 MG/DL (ref 70–110)

## 2021-08-04 PROCEDURE — 94761 N-INVAS EAR/PLS OXIMETRY MLT: CPT

## 2021-08-04 PROCEDURE — 97116 GAIT TRAINING THERAPY: CPT | Mod: CQ

## 2021-08-04 PROCEDURE — 63600175 PHARM REV CODE 636 W HCPCS: Performed by: PHYSICIAN ASSISTANT

## 2021-08-04 PROCEDURE — 99900035 HC TECH TIME PER 15 MIN (STAT)

## 2021-08-04 PROCEDURE — 97535 SELF CARE MNGMENT TRAINING: CPT

## 2021-08-04 PROCEDURE — 25000003 PHARM REV CODE 250: Performed by: PHYSICIAN ASSISTANT

## 2021-08-04 RX ORDER — HYDROCODONE BITARTRATE AND ACETAMINOPHEN 5; 325 MG/1; MG/1
1 TABLET ORAL EVERY 4 HOURS PRN
Qty: 60 TABLET | Refills: 0 | Status: SHIPPED | OUTPATIENT
Start: 2021-08-04 | End: 2021-08-09

## 2021-08-04 RX ORDER — DIAZEPAM 5 MG/1
5 TABLET ORAL EVERY 6 HOURS PRN
Qty: 10 TABLET | Refills: 0 | Status: SHIPPED | OUTPATIENT
Start: 2021-08-04 | End: 2021-08-09 | Stop reason: SDUPTHER

## 2021-08-04 RX ADMIN — HEPARIN SODIUM 5000 UNITS: 5000 INJECTION INTRAVENOUS; SUBCUTANEOUS at 06:08

## 2021-08-04 RX ADMIN — METOPROLOL SUCCINATE 100 MG: 50 TABLET, EXTENDED RELEASE ORAL at 08:08

## 2021-08-04 RX ADMIN — CEFAZOLIN 1 G: 330 INJECTION, POWDER, FOR SOLUTION INTRAMUSCULAR; INTRAVENOUS at 03:08

## 2021-08-04 RX ADMIN — MUPIROCIN: 20 OINTMENT TOPICAL at 08:08

## 2021-08-04 RX ADMIN — DULOXETINE 60 MG: 30 CAPSULE, DELAYED RELEASE ORAL at 08:08

## 2021-08-04 RX ADMIN — ALLOPURINOL 300 MG: 300 TABLET ORAL at 08:08

## 2021-08-04 RX ADMIN — HYDROCODONE BITARTRATE AND ACETAMINOPHEN 1 TABLET: 5; 325 TABLET ORAL at 08:08

## 2021-08-04 RX ADMIN — LISINOPRIL 10 MG: 5 TABLET ORAL at 08:08

## 2021-08-04 RX ADMIN — DOCUSATE SODIUM 50MG AND SENNOSIDES 8.6MG 1 TABLET: 8.6; 5 TABLET, FILM COATED ORAL at 08:08

## 2021-08-04 RX ADMIN — BUPROPION HYDROCHLORIDE 300 MG: 300 TABLET, FILM COATED, EXTENDED RELEASE ORAL at 08:08

## 2021-08-04 RX ADMIN — HYDROCODONE BITARTRATE AND ACETAMINOPHEN 1 TABLET: 5; 325 TABLET ORAL at 03:08

## 2021-08-09 ENCOUNTER — TELEPHONE (OUTPATIENT)
Dept: NEUROSURGERY | Facility: CLINIC | Age: 61
End: 2021-08-09

## 2021-08-09 ENCOUNTER — TELEPHONE (OUTPATIENT)
Dept: NEUROSURGERY | Facility: HOSPITAL | Age: 61
End: 2021-08-09

## 2021-08-09 RX ORDER — DIAZEPAM 5 MG/1
5 TABLET ORAL EVERY 6 HOURS PRN
Qty: 28 TABLET | Refills: 0 | Status: SHIPPED | OUTPATIENT
Start: 2021-08-09 | End: 2022-07-26

## 2021-08-09 RX ORDER — HYDROCODONE BITARTRATE AND ACETAMINOPHEN 10; 325 MG/1; MG/1
1 TABLET ORAL EVERY 4 HOURS PRN
Qty: 42 TABLET | Refills: 0 | Status: SHIPPED | OUTPATIENT
Start: 2021-08-09 | End: 2021-09-10 | Stop reason: SDUPTHER

## 2021-08-18 ENCOUNTER — TELEPHONE (OUTPATIENT)
Dept: NEUROSURGERY | Facility: CLINIC | Age: 61
End: 2021-08-18

## 2021-08-19 ENCOUNTER — TELEPHONE (OUTPATIENT)
Dept: NEUROSURGERY | Facility: CLINIC | Age: 61
End: 2021-08-19

## 2021-08-20 ENCOUNTER — TELEPHONE (OUTPATIENT)
Dept: NEUROSURGERY | Facility: CLINIC | Age: 61
End: 2021-08-20

## 2021-08-20 ENCOUNTER — CLINICAL SUPPORT (OUTPATIENT)
Dept: NEUROSURGERY | Facility: CLINIC | Age: 61
End: 2021-08-20
Payer: COMMERCIAL

## 2021-08-20 ENCOUNTER — HOSPITAL ENCOUNTER (OUTPATIENT)
Dept: RADIOLOGY | Facility: HOSPITAL | Age: 61
Discharge: HOME | End: 2021-08-20
Attending: NEUROLOGICAL SURGERY
Payer: COMMERCIAL

## 2021-08-20 VITALS — TEMPERATURE: 98 F | SYSTOLIC BLOOD PRESSURE: 120 MMHG | DIASTOLIC BLOOD PRESSURE: 82 MMHG | HEART RATE: 92 BPM

## 2021-08-20 DIAGNOSIS — Z98.1 S/P CERVICAL SPINAL FUSION: Primary | ICD-10-CM

## 2021-08-20 DIAGNOSIS — Z98.1 S/P CERVICAL SPINAL FUSION: ICD-10-CM

## 2021-08-20 PROCEDURE — 72040 X-RAY EXAM NECK SPINE 2-3 VW: CPT | Mod: TC

## 2021-08-20 PROCEDURE — 72040 X-RAY EXAM NECK SPINE 2-3 VW: CPT | Mod: 26,,, | Performed by: INTERNAL MEDICINE

## 2021-08-20 PROCEDURE — 99999 PR PBB SHADOW E&M-EST. PATIENT-LVL III: ICD-10-PCS | Mod: PBBFAC,,,

## 2021-08-20 PROCEDURE — 72040 XR CERVICAL SPINE AP LATERAL: ICD-10-PCS | Mod: 26,,, | Performed by: INTERNAL MEDICINE

## 2021-08-20 PROCEDURE — 99999 PR PBB SHADOW E&M-EST. PATIENT-LVL III: CPT | Mod: PBBFAC,,,

## 2021-08-24 ENCOUNTER — TELEPHONE (OUTPATIENT)
Dept: NEUROSURGERY | Facility: CLINIC | Age: 61
End: 2021-08-24

## 2021-09-03 ENCOUNTER — PATIENT MESSAGE (OUTPATIENT)
Dept: NEUROSURGERY | Facility: CLINIC | Age: 61
End: 2021-09-03

## 2021-09-08 ENCOUNTER — PATIENT MESSAGE (OUTPATIENT)
Dept: NEUROSURGERY | Facility: CLINIC | Age: 61
End: 2021-09-08

## 2021-09-13 ENCOUNTER — PATIENT MESSAGE (OUTPATIENT)
Dept: NEUROSURGERY | Facility: CLINIC | Age: 61
End: 2021-09-13

## 2021-09-14 ENCOUNTER — OFFICE VISIT (OUTPATIENT)
Dept: ORTHOPEDICS | Facility: CLINIC | Age: 61
End: 2021-09-14
Payer: COMMERCIAL

## 2021-09-14 ENCOUNTER — APPOINTMENT (OUTPATIENT)
Dept: RADIOLOGY | Facility: HOSPITAL | Age: 61
End: 2021-09-14
Attending: ORTHOPAEDIC SURGERY
Payer: COMMERCIAL

## 2021-09-14 VITALS
WEIGHT: 187.81 LBS | HEART RATE: 73 BPM | OXYGEN SATURATION: 99 % | RESPIRATION RATE: 18 BRPM | DIASTOLIC BLOOD PRESSURE: 62 MMHG | BODY MASS INDEX: 27.82 KG/M2 | HEIGHT: 69 IN | SYSTOLIC BLOOD PRESSURE: 116 MMHG

## 2021-09-14 DIAGNOSIS — M19.011 ARTHRITIS OF RIGHT GLENOHUMERAL JOINT: Primary | ICD-10-CM

## 2021-09-14 DIAGNOSIS — M25.511 RIGHT SHOULDER PAIN, UNSPECIFIED CHRONICITY: ICD-10-CM

## 2021-09-14 DIAGNOSIS — M25.511 RIGHT SHOULDER PAIN, UNSPECIFIED CHRONICITY: Primary | ICD-10-CM

## 2021-09-14 PROCEDURE — 99204 OFFICE O/P NEW MOD 45 MIN: CPT | Mod: S$GLB,,, | Performed by: ORTHOPAEDIC SURGERY

## 2021-09-14 PROCEDURE — 3044F HG A1C LEVEL LT 7.0%: CPT | Mod: CPTII,S$GLB,, | Performed by: ORTHOPAEDIC SURGERY

## 2021-09-14 PROCEDURE — 4010F PR ACE/ARB THEARPY RXD/TAKEN: ICD-10-PCS | Mod: CPTII,S$GLB,, | Performed by: ORTHOPAEDIC SURGERY

## 2021-09-14 PROCEDURE — 3008F PR BODY MASS INDEX (BMI) DOCUMENTED: ICD-10-PCS | Mod: CPTII,S$GLB,, | Performed by: ORTHOPAEDIC SURGERY

## 2021-09-14 PROCEDURE — 3074F PR MOST RECENT SYSTOLIC BLOOD PRESSURE < 130 MM HG: ICD-10-PCS | Mod: CPTII,S$GLB,, | Performed by: ORTHOPAEDIC SURGERY

## 2021-09-14 PROCEDURE — 3044F PR MOST RECENT HEMOGLOBIN A1C LEVEL <7.0%: ICD-10-PCS | Mod: CPTII,S$GLB,, | Performed by: ORTHOPAEDIC SURGERY

## 2021-09-14 PROCEDURE — 73030 X-RAY EXAM OF SHOULDER: CPT | Mod: TC,FY,PN,RT

## 2021-09-14 PROCEDURE — 1160F PR REVIEW ALL MEDS BY PRESCRIBER/CLIN PHARMACIST DOCUMENTED: ICD-10-PCS | Mod: CPTII,S$GLB,, | Performed by: ORTHOPAEDIC SURGERY

## 2021-09-14 PROCEDURE — 1160F RVW MEDS BY RX/DR IN RCRD: CPT | Mod: CPTII,S$GLB,, | Performed by: ORTHOPAEDIC SURGERY

## 2021-09-14 PROCEDURE — 99204 PR OFFICE/OUTPT VISIT, NEW, LEVL IV, 45-59 MIN: ICD-10-PCS | Mod: S$GLB,,, | Performed by: ORTHOPAEDIC SURGERY

## 2021-09-14 PROCEDURE — 3074F SYST BP LT 130 MM HG: CPT | Mod: CPTII,S$GLB,, | Performed by: ORTHOPAEDIC SURGERY

## 2021-09-14 PROCEDURE — 99999 PR PBB SHADOW E&M-EST. PATIENT-LVL V: CPT | Mod: PBBFAC,,, | Performed by: ORTHOPAEDIC SURGERY

## 2021-09-14 PROCEDURE — 3008F BODY MASS INDEX DOCD: CPT | Mod: CPTII,S$GLB,, | Performed by: ORTHOPAEDIC SURGERY

## 2021-09-14 PROCEDURE — 1159F MED LIST DOCD IN RCRD: CPT | Mod: CPTII,S$GLB,, | Performed by: ORTHOPAEDIC SURGERY

## 2021-09-14 PROCEDURE — 73030 XR SHOULDER TRAUMA 3 VIEW RIGHT: ICD-10-PCS | Mod: 26,RT,, | Performed by: RADIOLOGY

## 2021-09-14 PROCEDURE — 99999 PR PBB SHADOW E&M-EST. PATIENT-LVL V: ICD-10-PCS | Mod: PBBFAC,,, | Performed by: ORTHOPAEDIC SURGERY

## 2021-09-14 PROCEDURE — 3078F PR MOST RECENT DIASTOLIC BLOOD PRESSURE < 80 MM HG: ICD-10-PCS | Mod: CPTII,S$GLB,, | Performed by: ORTHOPAEDIC SURGERY

## 2021-09-14 PROCEDURE — 4010F ACE/ARB THERAPY RXD/TAKEN: CPT | Mod: CPTII,S$GLB,, | Performed by: ORTHOPAEDIC SURGERY

## 2021-09-14 PROCEDURE — 1159F PR MEDICATION LIST DOCUMENTED IN MEDICAL RECORD: ICD-10-PCS | Mod: CPTII,S$GLB,, | Performed by: ORTHOPAEDIC SURGERY

## 2021-09-14 PROCEDURE — 73030 X-RAY EXAM OF SHOULDER: CPT | Mod: 26,RT,, | Performed by: RADIOLOGY

## 2021-09-14 PROCEDURE — 3078F DIAST BP <80 MM HG: CPT | Mod: CPTII,S$GLB,, | Performed by: ORTHOPAEDIC SURGERY

## 2021-09-16 ENCOUNTER — OFFICE VISIT (OUTPATIENT)
Dept: NEUROSURGERY | Facility: CLINIC | Age: 61
End: 2021-09-16
Payer: COMMERCIAL

## 2021-09-16 ENCOUNTER — HOSPITAL ENCOUNTER (OUTPATIENT)
Dept: RADIOLOGY | Facility: HOSPITAL | Age: 61
Discharge: HOME | End: 2021-09-16
Attending: NEUROLOGICAL SURGERY
Payer: COMMERCIAL

## 2021-09-16 VITALS
BODY MASS INDEX: 27.7 KG/M2 | HEART RATE: 88 BPM | DIASTOLIC BLOOD PRESSURE: 74 MMHG | HEIGHT: 69 IN | WEIGHT: 187 LBS | TEMPERATURE: 98 F | SYSTOLIC BLOOD PRESSURE: 123 MMHG

## 2021-09-16 DIAGNOSIS — Z98.1 HISTORY OF FUSION OF CERVICAL SPINE: Primary | ICD-10-CM

## 2021-09-16 DIAGNOSIS — Z98.1 S/P CERVICAL SPINAL FUSION: ICD-10-CM

## 2021-09-16 PROCEDURE — 3044F HG A1C LEVEL LT 7.0%: CPT | Mod: CPTII,S$GLB,, | Performed by: NEUROLOGICAL SURGERY

## 2021-09-16 PROCEDURE — 99999 PR PBB SHADOW E&M-EST. PATIENT-LVL IV: CPT | Mod: PBBFAC,,, | Performed by: NEUROLOGICAL SURGERY

## 2021-09-16 PROCEDURE — 4010F ACE/ARB THERAPY RXD/TAKEN: CPT | Mod: CPTII,S$GLB,, | Performed by: NEUROLOGICAL SURGERY

## 2021-09-16 PROCEDURE — 3044F PR MOST RECENT HEMOGLOBIN A1C LEVEL <7.0%: ICD-10-PCS | Mod: CPTII,S$GLB,, | Performed by: NEUROLOGICAL SURGERY

## 2021-09-16 PROCEDURE — 3078F DIAST BP <80 MM HG: CPT | Mod: CPTII,S$GLB,, | Performed by: NEUROLOGICAL SURGERY

## 2021-09-16 PROCEDURE — 3008F BODY MASS INDEX DOCD: CPT | Mod: CPTII,S$GLB,, | Performed by: NEUROLOGICAL SURGERY

## 2021-09-16 PROCEDURE — 3074F SYST BP LT 130 MM HG: CPT | Mod: CPTII,S$GLB,, | Performed by: NEUROLOGICAL SURGERY

## 2021-09-16 PROCEDURE — 99024 PR POST-OP FOLLOW-UP VISIT: ICD-10-PCS | Mod: S$GLB,,, | Performed by: NEUROLOGICAL SURGERY

## 2021-09-16 PROCEDURE — 99024 POSTOP FOLLOW-UP VISIT: CPT | Mod: S$GLB,,, | Performed by: NEUROLOGICAL SURGERY

## 2021-09-16 PROCEDURE — 1159F PR MEDICATION LIST DOCUMENTED IN MEDICAL RECORD: ICD-10-PCS | Mod: CPTII,S$GLB,, | Performed by: NEUROLOGICAL SURGERY

## 2021-09-16 PROCEDURE — 3008F PR BODY MASS INDEX (BMI) DOCUMENTED: ICD-10-PCS | Mod: CPTII,S$GLB,, | Performed by: NEUROLOGICAL SURGERY

## 2021-09-16 PROCEDURE — 99999 PR PBB SHADOW E&M-EST. PATIENT-LVL IV: ICD-10-PCS | Mod: PBBFAC,,, | Performed by: NEUROLOGICAL SURGERY

## 2021-09-16 PROCEDURE — 1159F MED LIST DOCD IN RCRD: CPT | Mod: CPTII,S$GLB,, | Performed by: NEUROLOGICAL SURGERY

## 2021-09-16 PROCEDURE — 3078F PR MOST RECENT DIASTOLIC BLOOD PRESSURE < 80 MM HG: ICD-10-PCS | Mod: CPTII,S$GLB,, | Performed by: NEUROLOGICAL SURGERY

## 2021-09-16 PROCEDURE — 4010F PR ACE/ARB THEARPY RXD/TAKEN: ICD-10-PCS | Mod: CPTII,S$GLB,, | Performed by: NEUROLOGICAL SURGERY

## 2021-09-16 PROCEDURE — 3074F PR MOST RECENT SYSTOLIC BLOOD PRESSURE < 130 MM HG: ICD-10-PCS | Mod: CPTII,S$GLB,, | Performed by: NEUROLOGICAL SURGERY

## 2021-09-16 RX ORDER — TRAMADOL HYDROCHLORIDE 50 MG/1
50 TABLET ORAL 3 TIMES DAILY PRN
COMMUNITY
Start: 2021-07-22 | End: 2022-07-07

## 2021-09-16 RX ORDER — METFORMIN HYDROCHLORIDE 500 MG/1
500 TABLET, EXTENDED RELEASE ORAL 2 TIMES DAILY WITH MEALS
COMMUNITY
Start: 2021-06-22

## 2021-09-16 RX ORDER — ETODOLAC 500 MG/1
500 TABLET, FILM COATED ORAL 2 TIMES DAILY
COMMUNITY
Start: 2021-07-20 | End: 2021-11-02

## 2021-09-16 RX ORDER — TIZANIDINE 4 MG/1
TABLET ORAL
COMMUNITY
Start: 2021-06-22 | End: 2022-07-26

## 2021-09-16 RX ORDER — LISINOPRIL 10 MG/1
10 TABLET ORAL
COMMUNITY
Start: 2021-06-22

## 2021-10-28 ENCOUNTER — HOSPITAL ENCOUNTER (OUTPATIENT)
Dept: RADIOLOGY | Facility: HOSPITAL | Age: 61
Discharge: HOME | End: 2021-10-28
Attending: NEUROLOGICAL SURGERY
Payer: MEDICARE

## 2021-10-28 ENCOUNTER — OFFICE VISIT (OUTPATIENT)
Dept: NEUROSURGERY | Facility: CLINIC | Age: 61
End: 2021-10-28
Payer: COMMERCIAL

## 2021-10-28 VITALS — HEART RATE: 77 BPM | SYSTOLIC BLOOD PRESSURE: 157 MMHG | DIASTOLIC BLOOD PRESSURE: 86 MMHG

## 2021-10-28 DIAGNOSIS — Z98.1 HISTORY OF FUSION OF CERVICAL SPINE: ICD-10-CM

## 2021-10-28 DIAGNOSIS — Z98.1 HISTORY OF FUSION OF CERVICAL SPINE: Primary | ICD-10-CM

## 2021-10-28 PROCEDURE — 4010F ACE/ARB THERAPY RXD/TAKEN: CPT | Mod: CPTII,S$GLB,, | Performed by: NEUROLOGICAL SURGERY

## 2021-10-28 PROCEDURE — 3079F PR MOST RECENT DIASTOLIC BLOOD PRESSURE 80-89 MM HG: ICD-10-PCS | Mod: CPTII,S$GLB,, | Performed by: NEUROLOGICAL SURGERY

## 2021-10-28 PROCEDURE — 99024 POSTOP FOLLOW-UP VISIT: CPT | Mod: S$GLB,,, | Performed by: NEUROLOGICAL SURGERY

## 2021-10-28 PROCEDURE — 3079F DIAST BP 80-89 MM HG: CPT | Mod: CPTII,S$GLB,, | Performed by: NEUROLOGICAL SURGERY

## 2021-10-28 PROCEDURE — 72125 CT NECK SPINE W/O DYE: CPT | Mod: 26,,, | Performed by: STUDENT IN AN ORGANIZED HEALTH CARE EDUCATION/TRAINING PROGRAM

## 2021-10-28 PROCEDURE — 72125 CT CERVICAL SPINE WITHOUT CONTRAST: ICD-10-PCS | Mod: 26,,, | Performed by: STUDENT IN AN ORGANIZED HEALTH CARE EDUCATION/TRAINING PROGRAM

## 2021-10-28 PROCEDURE — 99024 PR POST-OP FOLLOW-UP VISIT: ICD-10-PCS | Mod: S$GLB,,, | Performed by: NEUROLOGICAL SURGERY

## 2021-10-28 PROCEDURE — 1159F PR MEDICATION LIST DOCUMENTED IN MEDICAL RECORD: ICD-10-PCS | Mod: CPTII,S$GLB,, | Performed by: NEUROLOGICAL SURGERY

## 2021-10-28 PROCEDURE — 1159F MED LIST DOCD IN RCRD: CPT | Mod: CPTII,S$GLB,, | Performed by: NEUROLOGICAL SURGERY

## 2021-10-28 PROCEDURE — 3044F PR MOST RECENT HEMOGLOBIN A1C LEVEL <7.0%: ICD-10-PCS | Mod: CPTII,S$GLB,, | Performed by: NEUROLOGICAL SURGERY

## 2021-10-28 PROCEDURE — 3077F PR MOST RECENT SYSTOLIC BLOOD PRESSURE >= 140 MM HG: ICD-10-PCS | Mod: CPTII,S$GLB,, | Performed by: NEUROLOGICAL SURGERY

## 2021-10-28 PROCEDURE — 3077F SYST BP >= 140 MM HG: CPT | Mod: CPTII,S$GLB,, | Performed by: NEUROLOGICAL SURGERY

## 2021-10-28 PROCEDURE — 99999 PR PBB SHADOW E&M-EST. PATIENT-LVL III: ICD-10-PCS | Mod: PBBFAC,,, | Performed by: NEUROLOGICAL SURGERY

## 2021-10-28 PROCEDURE — 3044F HG A1C LEVEL LT 7.0%: CPT | Mod: CPTII,S$GLB,, | Performed by: NEUROLOGICAL SURGERY

## 2021-10-28 PROCEDURE — 4010F PR ACE/ARB THEARPY RXD/TAKEN: ICD-10-PCS | Mod: CPTII,S$GLB,, | Performed by: NEUROLOGICAL SURGERY

## 2021-10-28 PROCEDURE — 99999 PR PBB SHADOW E&M-EST. PATIENT-LVL III: CPT | Mod: PBBFAC,,, | Performed by: NEUROLOGICAL SURGERY

## 2021-10-28 PROCEDURE — 72125 CT NECK SPINE W/O DYE: CPT | Mod: TC

## 2021-11-01 ENCOUNTER — PATIENT MESSAGE (OUTPATIENT)
Dept: ORTHOPEDICS | Facility: CLINIC | Age: 61
End: 2021-11-01
Payer: MEDICARE

## 2021-11-02 RX ORDER — MELOXICAM 7.5 MG/1
7.5 TABLET ORAL DAILY
Qty: 30 TABLET | Refills: 0 | Status: SHIPPED | OUTPATIENT
Start: 2021-11-02 | End: 2021-12-02 | Stop reason: SDUPTHER

## 2021-11-10 ENCOUNTER — OFFICE VISIT (OUTPATIENT)
Dept: ORTHOPEDICS | Facility: CLINIC | Age: 61
End: 2021-11-10
Payer: MEDICARE

## 2021-11-10 VITALS
SYSTOLIC BLOOD PRESSURE: 110 MMHG | HEIGHT: 69 IN | RESPIRATION RATE: 18 BRPM | OXYGEN SATURATION: 98 % | BODY MASS INDEX: 28.47 KG/M2 | HEART RATE: 76 BPM | DIASTOLIC BLOOD PRESSURE: 70 MMHG | WEIGHT: 192.25 LBS

## 2021-11-10 DIAGNOSIS — M25.511 RIGHT SHOULDER PAIN, UNSPECIFIED CHRONICITY: Primary | ICD-10-CM

## 2021-11-10 DIAGNOSIS — M19.011 ARTHRITIS OF RIGHT GLENOHUMERAL JOINT: ICD-10-CM

## 2021-11-10 PROCEDURE — 99215 OFFICE O/P EST HI 40 MIN: CPT | Mod: PBBFAC,PN | Performed by: ORTHOPAEDIC SURGERY

## 2021-11-10 PROCEDURE — 99213 OFFICE O/P EST LOW 20 MIN: CPT | Mod: S$PBB,,, | Performed by: ORTHOPAEDIC SURGERY

## 2021-11-10 PROCEDURE — 99213 PR OFFICE/OUTPT VISIT, EST, LEVL III, 20-29 MIN: ICD-10-PCS | Mod: S$PBB,,, | Performed by: ORTHOPAEDIC SURGERY

## 2021-11-10 PROCEDURE — 99999 PR PBB SHADOW E&M-EST. PATIENT-LVL V: CPT | Mod: PBBFAC,,, | Performed by: ORTHOPAEDIC SURGERY

## 2021-11-10 PROCEDURE — 99999 PR PBB SHADOW E&M-EST. PATIENT-LVL V: ICD-10-PCS | Mod: PBBFAC,,, | Performed by: ORTHOPAEDIC SURGERY

## 2021-12-03 RX ORDER — MELOXICAM 7.5 MG/1
7.5 TABLET ORAL DAILY
Qty: 30 TABLET | Refills: 0 | Status: SHIPPED | OUTPATIENT
Start: 2021-12-03 | End: 2021-12-08

## 2021-12-08 RX ORDER — MELOXICAM 7.5 MG/1
7.5 TABLET ORAL DAILY PRN
Qty: 90 TABLET | Refills: 0 | Status: SHIPPED | OUTPATIENT
Start: 2021-12-08 | End: 2022-01-06

## 2022-02-15 ENCOUNTER — TELEPHONE (OUTPATIENT)
Dept: NEUROSURGERY | Facility: CLINIC | Age: 62
End: 2022-02-15
Payer: MEDICARE

## 2022-02-15 NOTE — TELEPHONE ENCOUNTER
----- Message from Melania Watson RN sent at 2/11/2022  2:05 PM CST -----    ----- Message -----  From: Maegan Macias  Sent: 2/11/2022   1:52 PM CST  To: Lauren Felix Staff    Who Called: Patient    What is the reqeust in detail: Requesting call back to schedule 6 month check up since surgery and to discuss new items that have surfaced since then. Please advise.     Can the clinic reply by [facility]? No    Best Call Back Number: [phone]    Additional Information: Please advise.

## 2022-03-10 ENCOUNTER — HOSPITAL ENCOUNTER (OUTPATIENT)
Dept: RADIOLOGY | Facility: HOSPITAL | Age: 62
Discharge: HOME | End: 2022-03-10
Attending: NEUROLOGICAL SURGERY
Payer: MEDICARE

## 2022-03-10 ENCOUNTER — OFFICE VISIT (OUTPATIENT)
Dept: NEUROSURGERY | Facility: CLINIC | Age: 62
End: 2022-03-10
Payer: MEDICARE

## 2022-03-10 VITALS
WEIGHT: 195 LBS | BODY MASS INDEX: 28.88 KG/M2 | HEART RATE: 88 BPM | OXYGEN SATURATION: 98 % | DIASTOLIC BLOOD PRESSURE: 79 MMHG | HEIGHT: 69 IN | TEMPERATURE: 99 F | SYSTOLIC BLOOD PRESSURE: 144 MMHG

## 2022-03-10 DIAGNOSIS — Z98.1 HISTORY OF FUSION OF CERVICAL SPINE: ICD-10-CM

## 2022-03-10 DIAGNOSIS — Z98.1 S/P CERVICAL SPINAL FUSION: Primary | ICD-10-CM

## 2022-03-10 DIAGNOSIS — M54.2 NECK PAIN: ICD-10-CM

## 2022-03-10 PROCEDURE — 99999 PR PBB SHADOW E&M-EST. PATIENT-LVL V: ICD-10-PCS | Mod: PBBFAC,,, | Performed by: NURSE PRACTITIONER

## 2022-03-10 PROCEDURE — 72125 CT NECK SPINE W/O DYE: CPT | Mod: TC

## 2022-03-10 PROCEDURE — 99213 PR OFFICE/OUTPT VISIT, EST, LEVL III, 20-29 MIN: ICD-10-PCS | Mod: S$PBB,,, | Performed by: NURSE PRACTITIONER

## 2022-03-10 PROCEDURE — 72125 CT NECK SPINE W/O DYE: CPT | Mod: 26,,, | Performed by: RADIOLOGY

## 2022-03-10 PROCEDURE — 72125 CT CERVICAL SPINE WITHOUT CONTRAST: ICD-10-PCS | Mod: 26,,, | Performed by: RADIOLOGY

## 2022-03-10 PROCEDURE — 99213 OFFICE O/P EST LOW 20 MIN: CPT | Mod: S$PBB,,, | Performed by: NURSE PRACTITIONER

## 2022-03-10 PROCEDURE — 99215 OFFICE O/P EST HI 40 MIN: CPT | Mod: PBBFAC,25 | Performed by: NURSE PRACTITIONER

## 2022-03-10 PROCEDURE — 99999 PR PBB SHADOW E&M-EST. PATIENT-LVL V: CPT | Mod: PBBFAC,,, | Performed by: NURSE PRACTITIONER

## 2022-03-15 NOTE — PROGRESS NOTES
Neurosurgery  Established Patient    SUBJECTIVE:     History of Present Illness: Turner Silva is a 61 y.o. male s/p C1-C2 posterior spinal fusion 08/02/2021 with Dr. Aguilar. After the last appointment with Dr. Aguilar on 10/28/21, the patient reported to be doing well but still was experiencing decreased ROM. Unfortunately, his 3 month post-op CT showed no evidence of bony fusion. It was recommended that he obtain a repeat CT 6 months later to continue to evaluate for bony fusion. He is being seen in clinic today to review the CT findings. States that he continues to deny any neck pain. Intermittently, he experiences sharp headaches and continued decreased ROM. The patient is eager to start PT to assist with his ROM.     Review of patient's allergies indicates:  No Known Allergies    Current Outpatient Medications   Medication Sig Dispense Refill    acetaminophen (TYLENOL) 500 MG tablet Take 500 mg by mouth as needed for Pain.      alirocumab (PRALUENT PEN) 75 mg/mL PnIj Inject 75 mg into the skin every 14 (fourteen) days.      allopurinol (ZYLOPRIM) 300 MG tablet Take 300 mg by mouth once daily.      aspirin 81 MG Chew Take 81 mg by mouth.      buPROPion (WELLBUTRIN XL) 300 MG 24 hr tablet Take 300 mg by mouth once daily.      cholecalciferol, vitamin D3, (VITAMIN D3) 25 mcg (1,000 unit) capsule Take 1,000 Units by mouth once daily.      duloxetine (CYMBALTA) 60 MG capsule       gabapentin (NEURONTIN) 300 MG capsule 300 mg.       lisinopriL 10 MG tablet Take 10 mg by mouth.      meloxicam (MOBIC) 7.5 MG tablet TAKE 1 TABLET BY MOUTH EVERY DAY 30 tablet 0    metFORMIN (GLUCOPHAGE-XR) 500 MG ER 24hr tablet Take 500 mg by mouth 2 (two) times daily with meals.      metoprolol succinate (TOPROL-XL) 100 MG 24 hr tablet       multivitamin capsule Take 1 capsule by mouth once daily.      omega-3 acid ethyl esters (LOVAZA) 1 gram capsule TAKE 2 PILLS TWICE DAILY  0    pulse oximeter (PULSE OXIMETER)  device by Apply Externally route 2 (two) times a day. Use twice daily at 8 AM and 3 PM and record the value in [employer] as directed. 1 each 0    rosuvastatin (CRESTOR) 20 MG tablet Take 20 mg by mouth once daily.      tadalafiL (CIALIS) 5 MG tablet Take 5 mg by mouth once daily.      tiZANidine (ZANAFLEX) 4 MG tablet 1 2 PILL EVERY 6 8 HOURS MAX 36 MG A DAY      tiZANidine 2 mg Cap Take 2 mg by mouth.      traMADoL (ULTRAM) 50 mg tablet Take 50 mg by mouth 3 (three) times daily as needed.      colchicine 0.6 mg tablet Take 1 tablet (0.6 mg total) by mouth once daily. 90 tablet 0    diazePAM (VALIUM) 5 MG tablet Take 1 tablet (5 mg total) by mouth every 6 (six) hours as needed (muscle spasm). 28 tablet 0     Current Facility-Administered Medications   Medication Dose Route Frequency Provider Last Rate Last Admin    acetaminophen tablet 650 mg  650 mg Oral Once PRN Elia Tanner MD        albuterol inhaler 2 puff  2 puff Inhalation Q20 Min PRN Elia Tanner MD           Past Medical History:   Diagnosis Date    Gout     Hyperlipidemia     Hypertension     Myocardial infarction      Past Surgical History:   Procedure Laterality Date    CARPAL TUNNEL RELEASE Bilateral     cervical fusion      X 3    FUSION OF SPINE WITH INSTRUMENTATION N/A 8/2/2021    Procedure: Posterior C1-2 Fusion;  Surgeon: Isidro Aguilar MD;  Location: [facility];  Service: Neurosurgery;  Laterality: N/A;  Anesthesia; General  Blood: Type and Screen  NM: EMG, SEP, MEP  Rad: C-Arm  Brace: Leon HANNA  Bed: Reg slider w/ gel rolls  Bed Position: head to anesthesia  headrest: [place]  Misc: jayson Pruitt  Vendor: [employer]    LAMINECTOMY      ROTATOR CUFF REPAIR Left     X 2    SHOULDER SURGERY Left     TOTAL HIP ARTHROPLASTY Left 2018     Family History     Problem Relation (Age of Onset)    Gout Father    Heart disease Father    Rheum arthritis Mother, Sister        Social History     Socioeconomic History    Marital status:  "   Tobacco Use    Smoking status: Former Smoker     Packs/day: 1.00     Years: 10.00     Pack years: 10.00     Quit date: 10/1/1991     Years since quittin.4    Smokeless tobacco: Never Used   Substance and Sexual Activity    Alcohol use: Yes     Comment: Occasionally    Drug use: No       Review of Systems   Constitutional: Negative for activity change, appetite change and fever.   HENT: Negative for hearing loss and postnasal drip.    Eyes: Negative for pain and visual disturbance.   Respiratory: Negative for shortness of breath.    Cardiovascular: Negative for chest pain and palpitations.   Gastrointestinal: Negative for abdominal pain.   Endocrine: Negative for polydipsia, polyphagia and polyuria.   Musculoskeletal: Positive for neck stiffness. Negative for back pain and gait problem.   Neurological: Negative for dizziness, weakness, numbness and headaches.   Psychiatric/Behavioral: Negative for confusion and dysphoric mood.       OBJECTIVE:     Vital Signs  Temp: 98.7 °F (37.1 °C)  Pulse: 88  BP: (!) 144/79  SpO2: 98 %  Pain Score:   6  Height: 5' 9" (175.3 cm)  Weight: 88.5 kg (195 lb)  Body mass index is 28.8 kg/m².    Neurosurgery Physical Exam  General: well developed, well nourished, no distress.   Head: normocephalic, atraumatic  Neurologic: Alert and oriented. Thought content appropriate.  GCS: Motor: 6/Verbal: 5/Eyes: 4 GCS Total: 15  Mental Status: Awake, Alert, Oriented x 4  Language: No aphasia  Speech: No dysarthria  Cranial nerves: face symmetric, tongue midline, CN II-XII grossly intact.   Eyes: pupils equal, round, reactive to light with accomodation, EOMI.   Pulmonary: normal respirations, no signs of respiratory distress  Abdomen: soft, non-distended  Skin: Skin is warm, dry and intact. Incision well-healed  Sensory: intact to light touch throughout  Motor Strength:Moves all extremities spontaneously with good tone.    Strength  Deltoids Triceps Biceps Wrist Extension Wrist " Flexion Hand    Upper: R 5/5 5/5 5/5 5/5 5/5 5/5    L 5/5 5/5 5/5 5/5 5/5 5/5     HF KE KF DF PF EHL   Lower: R 5/5 5/5 5/5 5/5 5/5 5/5    L 5/5 5/5 5/5 5/5 5/5 5/5     Reflexes:   Grayson's: Negative.  Clonus: Negative.     Cerebellar:   Gait stable, fluid.      Cervical:   ROM: Pain limited with flexion, extension, lateral rotation and ear-to-shoulder bend.   Midline TTP: Negative.    Diagnostic Results:  I have personally reviewed the CT cervical spine dated 3/10/22 with Dr. Aguilar. The imaging shows postoperative change of C1-C2 PCF with retro odontoid soft tissue pannus with hardware in stable position with early fusion.     ASSESSMENT/PLAN:   Turner Silva is a 61 y.o. male s/p C1-C2 posterior spinal fusion 08/02/2021 with Dr. Aguilar.  Unfortunately, his 3 month post-op CT showed no evidence of bony fusion. It was recommended that he obtain a repeat CT 6 months later to continue to evaluate for bony fusion. He was seen in clinic today to review the CT findings. The CT showed early fusion. I have ordered PT to assist with his ROM. A repeat CT cervical spine for 3 months has also been ordered to continue to monitor for additional fusion. I have also reached out to the bone stimulator representative to ensure proper use of the stimulator.     I would like the patient to follow-up in clinic in 3 months with the repeat CT and progress with PT. I have encouraged him to contact the clinic with any questions, concerns, or adverse clinical changes. He verbalized understanding.     CASSY Sofia  Neurosurgery  [facility].     Note dictated with voice recognition software, please excuse any grammatical errors.

## 2022-04-18 ENCOUNTER — PATIENT MESSAGE (OUTPATIENT)
Dept: NEUROSURGERY | Facility: CLINIC | Age: 62
End: 2022-04-18
Payer: MEDICARE

## 2022-05-03 ENCOUNTER — PATIENT MESSAGE (OUTPATIENT)
Dept: NEUROSURGERY | Facility: CLINIC | Age: 62
End: 2022-05-03
Payer: MEDICARE

## 2022-05-04 ENCOUNTER — OFFICE VISIT (OUTPATIENT)
Dept: NEUROSURGERY | Facility: CLINIC | Age: 62
End: 2022-05-04
Payer: COMMERCIAL

## 2022-05-04 VITALS — SYSTOLIC BLOOD PRESSURE: 121 MMHG | DIASTOLIC BLOOD PRESSURE: 73 MMHG | HEART RATE: 76 BPM

## 2022-05-04 DIAGNOSIS — M54.12 CERVICAL RADICULOPATHY: ICD-10-CM

## 2022-05-04 DIAGNOSIS — Z98.1 S/P CERVICAL SPINAL FUSION: Primary | ICD-10-CM

## 2022-05-04 PROCEDURE — 99999 PR PBB SHADOW E&M-EST. PATIENT-LVL IV: ICD-10-PCS | Mod: PBBFAC,,, | Performed by: NURSE PRACTITIONER

## 2022-05-04 PROCEDURE — 99999 PR PBB SHADOW E&M-EST. PATIENT-LVL IV: CPT | Mod: PBBFAC,,, | Performed by: NURSE PRACTITIONER

## 2022-05-04 PROCEDURE — 99213 PR OFFICE/OUTPT VISIT, EST, LEVL III, 20-29 MIN: ICD-10-PCS | Mod: S$PBB,,, | Performed by: NURSE PRACTITIONER

## 2022-05-04 PROCEDURE — 99214 OFFICE O/P EST MOD 30 MIN: CPT | Mod: PBBFAC | Performed by: NURSE PRACTITIONER

## 2022-05-04 PROCEDURE — 99213 OFFICE O/P EST LOW 20 MIN: CPT | Mod: S$PBB,,, | Performed by: NURSE PRACTITIONER

## 2022-05-05 NOTE — PROGRESS NOTES
Neurosurgery  Established Patient    SUBJECTIVE:     History of Present Illness: Turner Silva is a 61 y.o. male s/p C1-C2 posterior spinal fusion 08/02/2021 with Dr. Aguilar. The patient is being seen in clinic today to discuss concerns with worsening cervical radiculopathy despite continued PT. States that for the past month the pain in located primarily on the left-side radiating down to his shoulder. Denies right-sided symptoms. He has tried medications and PT without relief. Denies weakness, b/b dysfunction, saddle anesthesia, or gait instability. Denies trauma.     Review of patient's allergies indicates:  No Known Allergies    Current Outpatient Medications   Medication Sig Dispense Refill    acetaminophen (TYLENOL) 500 MG tablet Take 500 mg by mouth as needed for Pain.      alirocumab (PRALUENT PEN) 75 mg/mL PnIj Inject 75 mg into the skin every 14 (fourteen) days.      allopurinol (ZYLOPRIM) 300 MG tablet Take 300 mg by mouth once daily.      aspirin 81 MG Chew Take 81 mg by mouth.      buPROPion (WELLBUTRIN XL) 300 MG 24 hr tablet Take 300 mg by mouth once daily.      cholecalciferol, vitamin D3, (VITAMIN D3) 25 mcg (1,000 unit) capsule Take 1,000 Units by mouth once daily.      duloxetine (CYMBALTA) 60 MG capsule       gabapentin (NEURONTIN) 300 MG capsule 300 mg.       lisinopriL 10 MG tablet Take 10 mg by mouth.      meloxicam (MOBIC) 7.5 MG tablet TAKE 1 TABLET BY MOUTH EVERY DAY 30 tablet 0    metFORMIN (GLUCOPHAGE-XR) 500 MG ER 24hr tablet Take 500 mg by mouth 2 (two) times daily with meals.      metoprolol succinate (TOPROL-XL) 100 MG 24 hr tablet       multivitamin capsule Take 1 capsule by mouth once daily.      omega-3 acid ethyl esters (LOVAZA) 1 gram capsule TAKE 2 PILLS TWICE DAILY  0    pulse oximeter (PULSE OXIMETER) device by Apply Externally route 2 (two) times a day. Use twice daily at 8 AM and 3 PM and record the value in [employer] as directed. 1 each 0    rosuvastatin  (CRESTOR) 20 MG tablet Take 20 mg by mouth once daily.      tadalafiL (CIALIS) 5 MG tablet Take 5 mg by mouth once daily.      tiZANidine (ZANAFLEX) 4 MG tablet 1 2 PILL EVERY 6 8 HOURS MAX 36 MG A DAY      tiZANidine 2 mg Cap Take 2 mg by mouth.      traMADoL (ULTRAM) 50 mg tablet Take 50 mg by mouth 3 (three) times daily as needed.      colchicine 0.6 mg tablet Take 1 tablet (0.6 mg total) by mouth once daily. 90 tablet 0    diazePAM (VALIUM) 5 MG tablet Take 1 tablet (5 mg total) by mouth every 6 (six) hours as needed (muscle spasm). 28 tablet 0     Current Facility-Administered Medications   Medication Dose Route Frequency Provider Last Rate Last Admin    acetaminophen tablet 650 mg  650 mg Oral Once PRN Elia Tanner MD        albuterol inhaler 2 puff  2 puff Inhalation Q20 Min PRN Elia Tanner MD           Past Medical History:   Diagnosis Date    Gout     Hyperlipidemia     Hypertension     Myocardial infarction      Past Surgical History:   Procedure Laterality Date    CARPAL TUNNEL RELEASE Bilateral     cervical fusion      X 3    FUSION OF SPINE WITH INSTRUMENTATION N/A 8/2/2021    Procedure: Posterior C1-2 Fusion;  Surgeon: Isidro Aguilar MD;  [facility];  Service: Neurosurgery;  Laterality: N/A;  Anesthesia; General  Blood: Type and Screen  NM: EMG, SEP, MEP  Rad: C-Arm  Brace: [place] CYNDI  Bed: Reg slider w/ gel rolls  Bed Position: head to anesthesia  headrest: [place]  Misc: jayson Pruitt  Vendor: [employer]    LAMINECTOMY      ROTATOR CUFF REPAIR Left     X 2    SHOULDER SURGERY Left     TOTAL HIP ARTHROPLASTY Left 2018     Family History     Problem Relation (Age of Onset)    Gout Father    Heart disease Father    Rheum arthritis Mother, Sister        Social History     Socioeconomic History    Marital status:    Tobacco Use    Smoking status: Former Smoker     Packs/day: 1.00     Years: 10.00     Pack years: 10.00     Quit date: 10/1/1991     Years since  quittin.6    Smokeless tobacco: Never Used   Substance and Sexual Activity    Alcohol use: Yes     Comment: Occasionally    Drug use: No       Review of Systems   Constitutional: Negative for activity change, appetite change and fever.   HENT: Negative for hearing loss and postnasal drip.    Eyes: Negative for pain and visual disturbance.   Respiratory: Negative for shortness of breath.    Cardiovascular: Negative for chest pain and palpitations.   Gastrointestinal: Negative for abdominal pain.   Endocrine: Negative for polydipsia, polyphagia and polyuria.   Musculoskeletal: Positive for arthralgias, myalgias, neck pain and neck stiffness. Negative for back pain and gait problem.   Neurological: Negative for dizziness, weakness, numbness and headaches.   Psychiatric/Behavioral: Negative for confusion and dysphoric mood.       OBJECTIVE:     Vital Signs  Pulse: 76  BP: 121/73  Pain Score:   7  There is no height or weight on file to calculate BMI.    Neurosurgery Physical Exam  General: well developed, well nourished, no distress.   Head: normocephalic, atraumatic  Neurologic: Alert and oriented. Thought content appropriate.  GCS: Motor: 6/Verbal: 5/Eyes: 4 GCS Total: 15  Mental Status: Awake, Alert, Oriented x 4  Language: No aphasia  Speech: No dysarthria  Cranial nerves: face symmetric, tongue midline, CN II-XII grossly intact.   Eyes: pupils equal, round, reactive to light with accomodation, EOMI.   Pulmonary: normal respirations, no signs of respiratory distress  Abdomen: soft, non-distended  Skin: Skin is warm, dry and intact.  Sensory: intact to light touch throughout  Motor Strength:Moves all extremities spontaneously with good tone.    Strength  Deltoids Triceps Biceps Wrist Extension Wrist Flexion Hand    Upper: R 5/5 5/5 5/5 5/5 5/5 5/5    L 5/5 5/5 5/5 5/5 5/5 5/5     HF KE KF DF PF EHL   Lower: R 5/5 5/5 5/5 5/5 5/5 5/5    L 5/5 5/5 5/5 5/5 5/5 5/5     Reflexes:   Grayson's:  Negative.  Clonus: Negative.     Cerebellar:   Gait stable, fluid.      Cervical:   ROM: Pain limited with flexion, extension, lateral rotation and ear-to-shoulder bend.   Midline TTP: Positive.     Diagnostic Results:  There is no new imaging to review for this encounter.     ASSESSMENT/PLAN:   Turner Silva is a 61 y.o. male s/p C1-C2 posterior spinal fusion 08/02/2021 with Dr. Aguilar. The patient was seen in clinic today to discuss concerns with worsening cervical radiculopathy despite continued PT. I have ordered an updated MRI to evaluate for stenosis contributing to his radicular complaints. I would like the patient to follow-up in clinic with Dr. Aguilar to review the image findings. I have encouraged him to contact the clinic with any questions, concerns, or adverse clinical changes. He verbalized understanding.     ANEL Sofia-PARDEEP  Neurosurgery  [facility].     Note dictated with voice recognition software, please excuse any grammatical errors.

## 2022-05-09 ENCOUNTER — PATIENT MESSAGE (OUTPATIENT)
Dept: NEUROSURGERY | Facility: CLINIC | Age: 62
End: 2022-05-09
Payer: MEDICARE

## 2022-05-11 ENCOUNTER — PATIENT MESSAGE (OUTPATIENT)
Dept: NEUROSURGERY | Facility: CLINIC | Age: 62
End: 2022-05-11
Payer: MEDICARE

## 2022-05-16 ENCOUNTER — PATIENT MESSAGE (OUTPATIENT)
Dept: NEUROSURGERY | Facility: CLINIC | Age: 62
End: 2022-05-16
Payer: MEDICARE

## 2022-05-23 ENCOUNTER — PATIENT MESSAGE (OUTPATIENT)
Dept: NEUROSURGERY | Facility: CLINIC | Age: 62
End: 2022-05-23
Payer: MEDICARE

## 2022-05-24 DIAGNOSIS — Z98.1 S/P CERVICAL SPINAL FUSION: Primary | ICD-10-CM

## 2022-05-30 ENCOUNTER — HOSPITAL ENCOUNTER (OUTPATIENT)
Dept: RADIOLOGY | Facility: HOSPITAL | Age: 62
Discharge: HOME | End: 2022-05-30
Attending: NURSE PRACTITIONER
Payer: MEDICARE

## 2022-05-30 DIAGNOSIS — Z98.1 S/P CERVICAL SPINAL FUSION: ICD-10-CM

## 2022-05-30 DIAGNOSIS — M54.12 CERVICAL RADICULOPATHY: ICD-10-CM

## 2022-05-30 PROCEDURE — 72141 MRI NECK SPINE W/O DYE: CPT | Mod: TC

## 2022-05-30 PROCEDURE — 72141 MRI CERVICAL SPINE WITHOUT CONTRAST: ICD-10-PCS | Mod: 26,,, | Performed by: RADIOLOGY

## 2022-05-30 PROCEDURE — 72141 MRI NECK SPINE W/O DYE: CPT | Mod: 26,,, | Performed by: RADIOLOGY

## 2022-05-31 ENCOUNTER — TELEPHONE (OUTPATIENT)
Dept: NEUROSURGERY | Facility: CLINIC | Age: 62
End: 2022-05-31
Payer: MEDICARE

## 2022-06-02 ENCOUNTER — TELEPHONE (OUTPATIENT)
Dept: NEUROSURGERY | Facility: CLINIC | Age: 62
End: 2022-06-02
Payer: MEDICARE

## 2022-06-02 NOTE — TELEPHONE ENCOUNTER
----- Message from Gricel Mcgrath NP sent at 6/1/2022  4:53 PM CDT -----  Regarding: MRI  Could you please call and let him know that Dr. Aguilar reviewed his MRI. The surgical site at C1-2 looks like the pannus has improved and there has been a decrease in stenosis. Above where he has his prior ACD there is some narrowing of the nerves. Dr. Aguilar recommended an injection at that level. If he is interested I can order the injection or we can discuss it further at his appointment.      21-Sep-2019 07:28

## 2022-06-13 ENCOUNTER — TELEPHONE (OUTPATIENT)
Dept: NEUROSURGERY | Facility: CLINIC | Age: 62
End: 2022-06-13
Payer: MEDICARE

## 2022-06-13 NOTE — TELEPHONE ENCOUNTER
----- Message from Belem Viveros MA sent at 6/9/2022  8:30 PM CDT -----  Regarding: FW: missed call about the MRI results    ----- Message -----  From: Johan Reyes  Sent: 6/9/2022   3:06 PM CDT  To: Lauren Felix Staff  Subject: missed call about the MRI results                  The Pt states that he was told to call you back to review his MRI results     # [phone]

## 2022-06-14 ENCOUNTER — OFFICE VISIT (OUTPATIENT)
Dept: NEUROSURGERY | Facility: CLINIC | Age: 62
End: 2022-06-14
Payer: MEDICARE

## 2022-06-14 ENCOUNTER — HOSPITAL ENCOUNTER (OUTPATIENT)
Dept: RADIOLOGY | Facility: HOSPITAL | Age: 62
Discharge: HOME | End: 2022-06-14
Attending: NURSE PRACTITIONER
Payer: MEDICARE

## 2022-06-14 VITALS — SYSTOLIC BLOOD PRESSURE: 119 MMHG | HEART RATE: 73 BPM | DIASTOLIC BLOOD PRESSURE: 72 MMHG

## 2022-06-14 DIAGNOSIS — Z98.1 S/P CERVICAL SPINAL FUSION: ICD-10-CM

## 2022-06-14 DIAGNOSIS — M19.90 ARTHRITIS: Primary | ICD-10-CM

## 2022-06-14 DIAGNOSIS — M54.2 NECK PAIN: ICD-10-CM

## 2022-06-14 DIAGNOSIS — M54.12 CERVICAL RADICULOPATHY: ICD-10-CM

## 2022-06-14 PROCEDURE — 99999 PR PBB SHADOW E&M-EST. PATIENT-LVL IV: CPT | Mod: PBBFAC,,, | Performed by: NURSE PRACTITIONER

## 2022-06-14 PROCEDURE — 72125 CT CERVICAL SPINE WITHOUT CONTRAST: ICD-10-PCS | Mod: 26,,, | Performed by: INTERNAL MEDICINE

## 2022-06-14 PROCEDURE — 99213 OFFICE O/P EST LOW 20 MIN: CPT | Mod: S$PBB,,, | Performed by: NURSE PRACTITIONER

## 2022-06-14 PROCEDURE — 72125 CT NECK SPINE W/O DYE: CPT | Mod: TC

## 2022-06-14 PROCEDURE — 99999 PR PBB SHADOW E&M-EST. PATIENT-LVL IV: ICD-10-PCS | Mod: PBBFAC,,, | Performed by: NURSE PRACTITIONER

## 2022-06-14 PROCEDURE — 99213 PR OFFICE/OUTPT VISIT, EST, LEVL III, 20-29 MIN: ICD-10-PCS | Mod: S$PBB,,, | Performed by: NURSE PRACTITIONER

## 2022-06-14 PROCEDURE — 99214 OFFICE O/P EST MOD 30 MIN: CPT | Mod: PBBFAC,25 | Performed by: NURSE PRACTITIONER

## 2022-06-14 PROCEDURE — 72125 CT NECK SPINE W/O DYE: CPT | Mod: 26,,, | Performed by: INTERNAL MEDICINE

## 2022-06-17 ENCOUNTER — PATIENT MESSAGE (OUTPATIENT)
Dept: NEUROSURGERY | Facility: CLINIC | Age: 62
End: 2022-06-17
Payer: MEDICARE

## 2022-06-17 ENCOUNTER — TELEPHONE (OUTPATIENT)
Dept: NEUROSURGERY | Facility: CLINIC | Age: 62
End: 2022-06-17
Payer: MEDICARE

## 2022-06-17 NOTE — TELEPHONE ENCOUNTER
----- Message from Gricel Mcgrath NP sent at 6/17/2022  9:23 AM CDT -----  Regarding: Next Steps  Could you please let him know that I reviewed all his imaging with Dr. Aguilar. He agrees with the injections with pain management. He would also like the patient to re-establish with rheumatology, so I have placed the referral. We will see him at the 1 year post-op evaluation.    Thanks!

## 2022-06-17 NOTE — PROGRESS NOTES
Neurosurgery  Established Patient    SUBJECTIVE:     History of Present Illness: Turner Silva is a 62 y.o. male s/p C1-C2 posterior spinal fusion 08/02/2021 with Dr. Aguilar. The patient is being seen in clinic today to review updated image findings regarding his left-sided neck pain and decreased left hand dexterity. States that he recently had an appointment with pain management to discuss injections and a plan has been established. He continues to deny any new neurological deficits or right-sided symptoms.     Review of patient's allergies indicates:  No Known Allergies    Current Outpatient Medications   Medication Sig Dispense Refill    acetaminophen (TYLENOL) 500 MG tablet Take 500 mg by mouth as needed for Pain.      alirocumab (PRALUENT PEN) 75 mg/mL PnIj Inject 75 mg into the skin every 14 (fourteen) days.      allopurinol (ZYLOPRIM) 300 MG tablet Take 300 mg by mouth once daily.      aspirin 81 MG Chew Take 81 mg by mouth.      buPROPion (WELLBUTRIN XL) 300 MG 24 hr tablet Take 300 mg by mouth once daily.      cholecalciferol, vitamin D3, (VITAMIN D3) 25 mcg (1,000 unit) capsule Take 1,000 Units by mouth once daily.      colchicine 0.6 mg tablet Take 1 tablet (0.6 mg total) by mouth once daily. 90 tablet 0    diazePAM (VALIUM) 5 MG tablet Take 1 tablet (5 mg total) by mouth every 6 (six) hours as needed (muscle spasm). 28 tablet 0    duloxetine (CYMBALTA) 60 MG capsule       gabapentin (NEURONTIN) 300 MG capsule 300 mg.       lisinopriL 10 MG tablet Take 10 mg by mouth.      meloxicam (MOBIC) 7.5 MG tablet TAKE 1 TABLET BY MOUTH EVERY DAY 30 tablet 0    metFORMIN (GLUCOPHAGE-XR) 500 MG ER 24hr tablet Take 500 mg by mouth 2 (two) times daily with meals.      metoprolol succinate (TOPROL-XL) 100 MG 24 hr tablet       multivitamin capsule Take 1 capsule by mouth once daily.      omega-3 acid ethyl esters (LOVAZA) 1 gram capsule TAKE 2 PILLS TWICE DAILY  0    pulse oximeter (PULSE  OXIMETER) device by Apply Externally route 2 (two) times a day. Use twice daily at 8 AM and 3 PM and record the value in [employer] [employer] as directed. 1 each 0    rosuvastatin (CRESTOR) 20 MG tablet Take 20 mg by mouth once daily.      tadalafiL (CIALIS) 5 MG tablet Take 5 mg by mouth once daily.      tiZANidine (ZANAFLEX) 4 MG tablet 1 2 PILL EVERY 6 8 HOURS MAX 36 MG A DAY      tiZANidine 2 mg Cap Take 2 mg by mouth.      traMADoL (ULTRAM) 50 mg tablet Take 50 mg by mouth 3 (three) times daily as needed.       Current Facility-Administered Medications   Medication Dose Route Frequency Provider Last Rate Last Admin    acetaminophen tablet 650 mg  650 mg Oral Once PRN Elia Tanner MD        albuterol inhaler 2 puff  2 puff Inhalation Q20 Min PRN Elia Tanner MD           Past Medical History:   Diagnosis Date    Gout     Hyperlipidemia     Hypertension     Myocardial infarction      Past Surgical History:   Procedure Laterality Date    CARPAL TUNNEL RELEASE Bilateral     cervical fusion      X 3    FUSION OF SPINE WITH INSTRUMENTATION N/A 2021    Procedure: Posterior C1-2 Fusion;  Surgeon: Isidro Aguilar MD;  Location: [facility];  Service: Neurosurgery;  Laterality: N/A;  Anesthesia; General  Blood: Type and Screen  NM: EMG, SEP, MEP  Rad: C-Arm  Brace: [place] CYNDI  Bed: Reg slider w/ gel rolls  Bed Position: head to anesthesia  headrest: [facility]: jayson Pruitt  Vendor: [employer]    LAMINECTOMY      ROTATOR CUFF REPAIR Left     X 2    SHOULDER SURGERY Left     TOTAL HIP ARTHROPLASTY Left 2018     Family History     Problem Relation (Age of Onset)    Gout Father    Heart disease Father    Rheum arthritis Mother, Sister        Social History     Socioeconomic History    Marital status:    Tobacco Use    Smoking status: Former Smoker     Packs/day: 1.00     Years: 10.00     Pack years: 10.00     Quit date: 10/1/1991     Years since quittin.7    Smokeless tobacco: Never Used    Substance and Sexual Activity    Alcohol use: Yes     Comment: Occasionally    Drug use: No       Review of Systems   Constitutional: Negative for activity change, appetite change and fever.   HENT: Negative for hearing loss and postnasal drip.    Eyes: Negative for pain and visual disturbance.   Respiratory: Negative for shortness of breath.    Cardiovascular: Negative for chest pain and palpitations.   Gastrointestinal: Negative for abdominal pain.   Endocrine: Negative for polydipsia, polyphagia and polyuria.   Musculoskeletal: Positive for arthralgias, myalgias, neck pain and neck stiffness. Negative for back pain and gait problem.   Neurological: Negative for dizziness, weakness, numbness and headaches.   Psychiatric/Behavioral: Negative for confusion and dysphoric mood.       OBJECTIVE:     Vital Signs  Pulse: 73  BP: 119/72  Pain Score:   6  There is no height or weight on file to calculate BMI.    Neurosurgery Physical Exam  General: well developed, well nourished, no distress.   Head: normocephalic, atraumatic  Neurologic: Alert and oriented. Thought content appropriate.  GCS: Motor: 6/Verbal: 5/Eyes: 4 GCS Total: 15  Mental Status: Awake, Alert, Oriented x 4  Language: No aphasia  Speech: No dysarthria  Cranial nerves: face symmetric, tongue midline, CN II-XII grossly intact.   Eyes: pupils equal, round, reactive to light with accomodation, EOMI.   Pulmonary: normal respirations, no signs of respiratory distress  Abdomen: soft, non-distended  Skin: Skin is warm, dry and intact.  Sensory: intact to light touch throughout  Motor Strength:Moves all extremities spontaneously with good tone.  Full strength upper and lower extremities. No abnormal movements seen.     Diagnostic Results:  I have personally reviewed the MRI cervical spine dated 5/30/22 and CT cervical spine dated 6/14/22 with Dr. Aguilar.    The MRI shows stable postoperative changes of C1-C2 posterior fusion and anterior fusion of C5 through  C7. Mild degenerative change with severe left neural foraminal narrowing at C3-C4 and C4-C5. No significant canal stenosis.     ASSESSMENT/PLAN:   Turner Silva is a 62 y.o. male s/p C1-C2 posterior spinal fusion 08/02/2021 with Dr. Aguilar. The patient was seen in clinic today to review updated image findings regarding his left-sided neck pain and decreased left hand dexterity. After reviewing the imaging and concerns with Dr. Aguilar, he recommends the injections as discussed with pain management. A referral to rheumatology has also been placed to evaluate his hx of RA and arthralgias.     I would like the patient to follow-up in clinic for his 1 year post-op evaluation. I have encouraged him to contact the clinic with any questions, concerns, or adverse clinical changes. He verbalized understanding.     ANEL Sofia-PARDEEP  Neurosurgery  [facility].     Note dictated with voice recognition software, please excuse any grammatical errors.

## 2022-07-04 ENCOUNTER — PATIENT MESSAGE (OUTPATIENT)
Dept: NEUROSURGERY | Facility: CLINIC | Age: 62
End: 2022-07-04
Payer: MEDICARE

## 2022-07-07 ENCOUNTER — TELEPHONE (OUTPATIENT)
Dept: PAIN MEDICINE | Facility: CLINIC | Age: 62
End: 2022-07-07
Payer: MEDICARE

## 2022-07-07 ENCOUNTER — OFFICE VISIT (OUTPATIENT)
Dept: PAIN MEDICINE | Facility: CLINIC | Age: 62
End: 2022-07-07
Payer: MEDICARE

## 2022-07-07 VITALS
RESPIRATION RATE: 18 BRPM | SYSTOLIC BLOOD PRESSURE: 121 MMHG | DIASTOLIC BLOOD PRESSURE: 74 MMHG | BODY MASS INDEX: 28.88 KG/M2 | HEART RATE: 83 BPM | WEIGHT: 195 LBS | HEIGHT: 69 IN

## 2022-07-07 DIAGNOSIS — M96.1 POST LAMINECTOMY SYNDROME: Primary | ICD-10-CM

## 2022-07-07 DIAGNOSIS — M54.12 CERVICAL RADICULOPATHY: Primary | ICD-10-CM

## 2022-07-07 DIAGNOSIS — M79.10 MYALGIA: ICD-10-CM

## 2022-07-07 DIAGNOSIS — R29.898 WEAKNESS OF LEFT UPPER EXTREMITY: ICD-10-CM

## 2022-07-07 DIAGNOSIS — M54.12 CERVICAL RADICULOPATHY: ICD-10-CM

## 2022-07-07 PROCEDURE — 20553 NJX 1/MLT TRIGGER POINTS 3/>: CPT | Mod: S$PBB,ICN,CMP, | Performed by: STUDENT IN AN ORGANIZED HEALTH CARE EDUCATION/TRAINING PROGRAM

## 2022-07-07 PROCEDURE — 99999 PR PBB SHADOW E&M-EST. PATIENT-LVL V: ICD-10-PCS | Mod: PBBFAC,,, | Performed by: STUDENT IN AN ORGANIZED HEALTH CARE EDUCATION/TRAINING PROGRAM

## 2022-07-07 PROCEDURE — 20553 NJX 1/MLT TRIGGER POINTS 3/>: CPT | Mod: PBBFAC | Performed by: STUDENT IN AN ORGANIZED HEALTH CARE EDUCATION/TRAINING PROGRAM

## 2022-07-07 PROCEDURE — 99999 PR PBB SHADOW E&M-EST. PATIENT-LVL V: CPT | Mod: PBBFAC,,, | Performed by: STUDENT IN AN ORGANIZED HEALTH CARE EDUCATION/TRAINING PROGRAM

## 2022-07-07 PROCEDURE — 20553 PR INJECT TRIGGER POINTS, > 3: ICD-10-PCS | Mod: S$PBB,ICN,CMP, | Performed by: STUDENT IN AN ORGANIZED HEALTH CARE EDUCATION/TRAINING PROGRAM

## 2022-07-07 PROCEDURE — 99204 PR OFFICE/OUTPT VISIT, NEW, LEVL IV, 45-59 MIN: ICD-10-PCS | Mod: 25,S$PBB,ICN,CMP | Performed by: STUDENT IN AN ORGANIZED HEALTH CARE EDUCATION/TRAINING PROGRAM

## 2022-07-07 PROCEDURE — 99215 OFFICE O/P EST HI 40 MIN: CPT | Mod: PBBFAC | Performed by: STUDENT IN AN ORGANIZED HEALTH CARE EDUCATION/TRAINING PROGRAM

## 2022-07-07 PROCEDURE — 99204 OFFICE O/P NEW MOD 45 MIN: CPT | Mod: 25,S$PBB,ICN,CMP | Performed by: STUDENT IN AN ORGANIZED HEALTH CARE EDUCATION/TRAINING PROGRAM

## 2022-07-07 RX ORDER — HYDROCODONE BITARTRATE AND ACETAMINOPHEN 5; 325 MG/1; MG/1
1 TABLET ORAL EVERY 8 HOURS PRN
Qty: 21 TABLET | Refills: 0 | Status: SHIPPED | OUTPATIENT
Start: 2022-07-07 | End: 2022-07-15

## 2022-07-07 NOTE — PROGRESS NOTES
Chronic Pain - New Consult    Referring Physician: Tj Nayak,*    Date: 07/07/2022     Re: Turner Silva  MR#: [id number]  YOB: 1960  Age: 62 y.o.    Chief Complaint:   Chief Complaint   Patient presents with    Low-back Pain    Mid-back Pain    Neck Pain    Shoulder Pain     left     **This note is dictated using the M*Modal Fluency Direct word recognition program. There are word recognition mistakes that are occasionally missed on review.**    ASSESSMENT: 62 y.o. year old male with neck and shoulder/arm pain, consistent with     1. Post laminectomy syndrome  HYDROcodone-acetaminophen (NORCO) 5-325 mg per tablet   2. Cervical radiculopathy  HYDROcodone-acetaminophen (NORCO) 5-325 mg per tablet   3. Weakness of left upper extremity     4. Myalgia           PLAN:     Cervical radiculopathy / DDD  -patient has had 4 neck surgeries including recent C1-2 PSF and C5-7 ACDF.  Has left UE weakness, and symptoms of radiculopathy and DDD.  -discused trying cervical epidural injection.  Due to the presence of the hardware, it may be difficult to get an appropriate view and there is a risk that the procedure will have to be aborted due to this.  The patient is aware.  -We will schedule the patient for cervical epidural injection.  Risks,benefits, and alternatives of the procedure were discussed with the patient and He would like to proceed with the procedure.  At this juncture, we believe that the patient's medical comorbidity status adds high risk and complexity to our proposed evaluation and treatment.  -Sent a message to neurosurgery to make sure that it is okay to get steroids and that it will not interfere with the healing of the bone graft  -discussed SCS and treatment option for pain, but that it will not help his dexterity.  -Short course of Norco 5mg #21 tabs for severe pain. Patient aware that this is just for breakthrough, severe pain and should not be taken every day.      - RTC 4  weeks  - Counseled patient regarding the importance of  activity modification.    The above plan and management options were discussed at length with patient. Patient is in agreement with the above and verbalized understanding. It will be communicated with the referring physician via electronic record, fax, or mail.  Lab/study reports reviewed were important and necessary because subsequent medical and treatment recommendations required review of the above lab/study reports. Images viewed/reviewed above were important and necessary because subsequent medical and treatment recommendations required review of the reviewed image(s).     Electronically signed by:  Tj Nayak DO  07/07/2022    =========================================================================================================    SUBJECTIVE:    Turner Silva is a 62 y.o. male presents to the clinic for the evaluation of neck pain. The pain started 20 years ago following no inciting event and symptoms have been worsening.  States that he has good and bad days.  Pain is in the neck and down into the left scapular and shoulder.     Pain Description:    The pain is located in the neck area and radiates to the left shoulder(blade)/ down the arm .    At BEST  6/10   At WORST  10/10 on the WORST day.    On average pain is rated as 5/10.   Today the pain is rated as 7/10  The pain is continuous.  The pain is described as aching, burning and throbbing    Symptoms interfere with daily activity and sleeping.   Exacerbating factors: Lifting and moving towards the left side.    Mitigating factors nothing and heat.   He reports 8 hours of sleep per night.    Physical Therapy/Home Exercise: Yes, currently in Physical therapy    Current Pain Medications:    - tylenol    Failed Pain Medications:    - tizanidine, meloxicam, tramadol, hydrocodone    Pain Treatment Therapies:    Pain procedures:   Epidural LBP > 1 year ago.  Physical Therapy: has home  exercise program and compelted PT in 06/2022  Chiropractor: none  Acupuncture: none  TENS unit: none  Spinal decompression:   4 cervical spine surgeries  2 lumbar spine surgeries  Joint replacement: left shoulder and left hip replacement    Patient denies urinary incontinence, bowel incontinence, significant motor weakness and loss of sensations.  Patient denies any suicidal or homicidal ideations     report:  Reviewed and consistent with medication use as prescribed.    Imaging:   MRI Cervical 2022:  FINDINGS:  Postoperative changes of C1-C2 posterior fusion with bilateral pedicle screws.  Minimal anterolisthesis of C7 on T1 similar to CT examination 03/10/2022     Additional postoperative changes of anterior fusion of C5 through C7 with partial osseous fusion of the C5 through C7 vertebral bodies.  There is straightening of the normal cervical lordosis.  No acute fracture or marrow placement process.     Cord: Normal contour and caliber.  No cord signal abnormality.     Skull base and craniocervical junction: Significant soft tissue surrounding the odontoid, resulting in mild narrowing of the craniocervical junction, similar to prior.     Degenerative findings:     C2-C3: Facet arthropathy and posterior disc osteophyte complex without significant spinal canal stenosis or neural foraminal narrowing.     C3-C4: Bilateral facet arthropathy and uncovertebral spurring results in severe left and mild right neural foraminal narrowing.  No significant spinal canal stenosis.     C4-C5: Bilateral facet arthropathy and uncovertebral spurring results in severe left and moderate right neural foraminal narrowing.  Posterior disc osteophyte complex results in mild spinal canal stenosis.     C5-C6: Facet arthropathy and uncovertebral spurring results in mild bilateral neural foraminal narrowing.  No significant canal stenosis.     C6-C7: Facet arthropathy and uncovertebral spurring results in moderate left and mild right  neural foraminal narrowing.  No significant spinal canal stenosis.     C7-T1: Facet arthropathy particularly left sided, uncovertebral spurring, and posterior disc osteophyte complex results in moderate bilateral neural foraminal narrowing and mild spinal canal stenosis.     Paraspinal muscles & soft tissues: Unremarkable.     Impression:     Stable postoperative changes of C1-C2 posterior fusion and anterior fusion of C5 through C7.     Mild degenerative change with severe left neural foraminal narrowing at C3-C4 and C4-C5 with additional multilevel neural foraminal narrowing.  No significant canal stenosis.     Similar appearance of soft tissue pannus posterior to the odontoid resulting in at most mild narrowing at the craniocervical junction.    Past Medical History:   Diagnosis Date    Gout     Hyperlipidemia     Hypertension     Myocardial infarction      Past Surgical History:   Procedure Laterality Date    CARPAL TUNNEL RELEASE Bilateral     cervical fusion      X 3    FUSION OF SPINE WITH INSTRUMENTATION N/A 2021    Procedure: Posterior C1-2 Fusion;  Surgeon: Isidro Aguilar MD;  Location: [facility];  Service: Neurosurgery;  Laterality: N/A;  Anesthesia; General  Blood: Type and Screen  NM: EMG, SEP, MEP  Rad: C-Arm  Brace: Leon HANNA  Bed: Reg slider w/ gel rolls  Bed Position: head to anesthesia  headrest: [place]  Misc: jayson Pruitt  Vendor: [employer]    LAMINECTOMY      ROTATOR CUFF REPAIR Left     X 2    SHOULDER SURGERY Left     TOTAL HIP ARTHROPLASTY Left 2018     Social History     Socioeconomic History    Marital status:    Tobacco Use    Smoking status: Former Smoker     Packs/day: 1.00     Years: 10.00     Pack years: 10.00     Quit date: 10/1/1991     Years since quittin.7    Smokeless tobacco: Never Used   Substance and Sexual Activity    Alcohol use: Yes     Comment: Occasionally    Drug use: No     Family History   Problem Relation Age of Onset    Rheum arthritis  Mother     Heart disease Father     Gout Father     Rheum arthritis Sister        Review of patient's allergies indicates:  No Known Allergies    Current Outpatient Medications   Medication Sig    acetaminophen (TYLENOL) 500 MG tablet Take 500 mg by mouth as needed for Pain.    alirocumab (PRALUENT PEN) 75 mg/mL PnIj Inject 75 mg into the skin every 14 (fourteen) days.    allopurinol (ZYLOPRIM) 300 MG tablet Take 300 mg by mouth once daily.    aspirin 81 MG Chew Take 81 mg by mouth.    buPROPion (WELLBUTRIN XL) 300 MG 24 hr tablet Take 300 mg by mouth once daily.    cholecalciferol, vitamin D3, (VITAMIN D3) 25 mcg (1,000 unit) capsule Take 1,000 Units by mouth once daily.    colchicine 0.6 mg tablet Take 1 tablet (0.6 mg total) by mouth once daily.    diazePAM (VALIUM) 5 MG tablet Take 1 tablet (5 mg total) by mouth every 6 (six) hours as needed (muscle spasm).    duloxetine (CYMBALTA) 60 MG capsule     gabapentin (NEURONTIN) 300 MG capsule 300 mg.     lisinopriL 10 MG tablet Take 10 mg by mouth.    meloxicam (MOBIC) 7.5 MG tablet TAKE 1 TABLET BY MOUTH EVERY DAY    metFORMIN (GLUCOPHAGE-XR) 500 MG ER 24hr tablet Take 500 mg by mouth 2 (two) times daily with meals.    metoprolol succinate (TOPROL-XL) 100 MG 24 hr tablet     multivitamin capsule Take 1 capsule by mouth once daily.    omega-3 acid ethyl esters (LOVAZA) 1 gram capsule TAKE 2 PILLS TWICE DAILY    pulse oximeter (PULSE OXIMETER) device by Apply Externally route 2 (two) times a day. Use twice daily at 8 AM and 3 PM and [facility] as directed.    rosuvastatin (CRESTOR) 20 MG tablet Take 20 mg by mouth once daily.    tadalafiL (CIALIS) 5 MG tablet Take 5 mg by mouth once daily.    tiZANidine (ZANAFLEX) 4 MG tablet 1 2 PILL EVERY 6 8 HOURS MAX 36 MG A DAY    tiZANidine 2 mg Cap Take 2 mg by mouth.    HYDROcodone-acetaminophen (NORCO) 5-325 mg per tablet Take 1 tablet by mouth every 8 (eight) hours as needed for Pain.  "    Current Facility-Administered Medications   Medication    acetaminophen tablet 650 mg    albuterol inhaler 2 puff       REVIEW OF SYSTEMS:    GENERAL:  No weight loss, malaise or fevers.  HEENT:   No recent changes in vision or hearing  NECK:  Negative for lumps, no difficulty with swallowing.  RESPIRATORY:  Negative for cough, wheezing or shortness of breath, patient denies any recent URI.  CARDIOVASCULAR:  Negative for chest pain, leg swelling or palpitations.  GI:  Negative for abdominal discomfort, blood in stools or black stools or change in bowel habits.  MUSCULOSKELETAL:  See HPI.  SKIN:  Negative for lesions, rash, and itching.  PSYCH:  No mood disorder or recent psychosocial stressors.  Patients sleep is not disturbed secondary to pain.  HEMATOLOGY/LYMPHOLOGY:  Negative for prolonged bleeding, bruising easily or swollen nodes.  Patient is not currently taking any anti-coagulants + bruising easily   NEURO:   No history of headaches, syncope, paralysis, seizures or tremors. + headaches & tremors  All other reviewed and negative other than HPI.     OBJECTIVE:    /74   Pulse 83   Resp 18   Ht 5' 9" (1.753 m)   Wt 88.5 kg (195 lb)   BMI 28.80 kg/m²     PHYSICAL EXAMINATION:    GENERAL: Well appearing, in no acute distress, alert and oriented x3.  PSYCH:  Mood and affect appropriate.  SKIN: Skin color, texture, turgor normal, no rashes or lesions.  HEAD/FACE:  Normocephalic, atraumatic. Cranial nerves grossly intact.    NECK:   - Positive pain to palpation over the cervical paraspinous muscles. With trigger points  - Spurling  Positive.  - Positive pain with neck flexion, extension, or lateral flexion.     CV: RRR with palpation of the radial artery.  PULM: CTAB. No evidence of respiratory difficulty, symmetric chest rise.  GI:  Soft and non-tender.    MUSCULOSKELETAL:  No atrophy or tone abnormalities are noted in the UE or LE.  No deformities, edema, or skin discoloration are noted on visible " skin. Good capillary refill.    NEURO: Bilateral upper and lower extremity coordination and muscle stretch reflexes are physiologic and symmetric.      NEUROLOGICAL EXAM:  MENTAL STATUS: A x O x 3, good concentration, speech is fluent and goal directed  MEMORY: recent and remote are intact  CN: CN2-12 grossly intact  MOTOR: 5/5 in all muscle groups of the RUE but 4/5 in all muscles of the LUE  DTRs: 2+ intact symmetric  Sensation:    -no Loss of sensation in a left upper and right upper C-4, C-5, C-6, C-7 and C-8 bilaterally distribution.  [place]: absent on the bilateral side(s)  Clonus: absent on the bilateral side(s)

## 2022-07-07 NOTE — PROCEDURES
"Procedures     PROCEDURE: Trigger Point Injections - Location of Myofascial Pain - left CERVICAL Paraspinals (Splenius Capitis, Semispinalis Capitis, Longissimus Capitis), Trapezius, Levator Scapulae and Rhomboid Minor    PATIENT NAME: Turner Silva   MRN: [id number]     DATE OF PROCEDURE: 07/07/2022    Diagnosis: Myalgia (M79.1)  CPT code: 71868 (Injection(s); single or multiple trigger point(s), 3 or more muscles)    Injection # 1 this year.    Postprocedural Diagnosis: Same  Needle Type: - 27G 1.5" needle    Solution injected: A 10ml mixture of 0.25% Bupivacaine  Volume Injected for each Trigger Point: 4ml  Number of Trigger Point Injected: 2    Estimated Blood Loss - <2ml  Drains: None  Specimens Removed: None  Urine Output - Not Measured  Complications: None  Outcome: Good  VAS Before Injection:  7/10  VAS After Injection:  6/10    Informed Consent:  The patient's condition and proposed procedures, risks (including complications of nerve damage,  bleeding, infection, and failure of pain relief), and alternatives were discussed with the patient or responsible party.  The patient's/responsible party's questions were answered.  The patient/responsible party appeared to understand and chose to proceed.  Informed consent was obtained.    Procedure in Detail:  The procedure was performed in the procedure treatment room.  After obtaining written consent, the patient was placed in a seated position. By palpation the above noted number of trigger points were identified that reproduced the patient's typical radiating pain pattern. The trigger points were located in the above noted muscles. Each of the target sites of injection was prepped using an antiseptic solution.     Procedural Pause:  A procedural pause verifying correct patient, medical record number, allergies, medications to be administered, current vital signs, and surgical site was performed immediately prior to beginning the procedure.    The above noted " needle was advanced towards each trigger point until the patient's typical radiating pain pattern was reproduced. After negative aspiration for heme, the above noted solution was injected in a fanned-out distribution at each trigger point. The needle(s) was then removed.     All sites were done in the same manner. The patient tolerated the procedure well and without complications. After meeting discharge criteria, the patient was discharged home safely.    DISCUSSION:  Trigger point injections were performed today to treat the patients myofascial pain. The purpose was to improve the patients function and decrease pain. We have reminded the patient that the trigger point injections must be done in conjunction with a stretching program.  Without a stretching program, results from trigger point injections are often suboptimal.  The patient was advised to relax and avoid any heavy lifting or excessive bending for 24 hours. He was advised that he may return to his usual activities after 24 hours if he is otherwise feeling well.  The patient was advised not to bathe or soak in water for 24 hours but that showering would be acceptable.      Note Electronically Signed By:  Tj Weir  07/07/2022

## 2022-07-07 NOTE — H&P (VIEW-ONLY)
Chronic Pain - New Consult    Referring Physician: Tj Nayak,*    Date: 07/07/2022     Re: Turner Silva  MR#: [id number]  YOB: 1960  Age: 62 y.o.    Chief Complaint:   Chief Complaint   Patient presents with    Low-back Pain    Mid-back Pain    Neck Pain    Shoulder Pain     left     **This note is dictated using the M*Modal Fluency Direct word recognition program. There are word recognition mistakes that are occasionally missed on review.**    ASSESSMENT: 62 y.o. year old male with neck and shoulder/arm pain, consistent with     1. Post laminectomy syndrome  HYDROcodone-acetaminophen (NORCO) 5-325 mg per tablet   2. Cervical radiculopathy  HYDROcodone-acetaminophen (NORCO) 5-325 mg per tablet   3. Weakness of left upper extremity     4. Myalgia           PLAN:     Cervical radiculopathy / DDD  -patient has had 4 neck surgeries including recent C1-2 PSF and C5-7 ACDF.  Has left UE weakness, and symptoms of radiculopathy and DDD.  -discused trying cervical epidural injection.  Due to the presence of the hardware, it may be difficult to get an appropriate view and there is a risk that the procedure will have to be aborted due to this.  The patient is aware.  -We will schedule the patient for cervical epidural injection.  Risks,benefits, and alternatives of the procedure were discussed with the patient and He would like to proceed with the procedure.  At this juncture, we believe that the patient's medical comorbidity status adds high risk and complexity to our proposed evaluation and treatment.  -Sent a message to neurosurgery to make sure that it is okay to get steroids and that it will not interfere with the healing of the bone graft  -discussed SCS and treatment option for pain, but that it will not help his dexterity.  -Short course of Norco 5mg #21 tabs for severe pain. Patient aware that this is just for breakthrough, severe pain and should not be taken every day.      - RTC 4  weeks  - Counseled patient regarding the importance of  activity modification.    The above plan and management options were discussed at length with patient. Patient is in agreement with the above and verbalized understanding. It will be communicated with the referring physician via electronic record, fax, or mail.  Lab/study reports reviewed were important and necessary because subsequent medical and treatment recommendations required review of the above lab/study reports. Images viewed/reviewed above were important and necessary because subsequent medical and treatment recommendations required review of the reviewed image(s).     Electronically signed by:  Tj Nayak DO  07/07/2022    =========================================================================================================    SUBJECTIVE:    Turner Silva is a 62 y.o. male presents to the clinic for the evaluation of neck pain. The pain started 20 years ago following no inciting event and symptoms have been worsening.  States that he has good and bad days.  Pain is in the neck and down into the left scapular and shoulder.     Pain Description:    The pain is located in the neck area and radiates to the left shoulder(blade)/ down the arm .    At BEST  6/10   At WORST  10/10 on the WORST day.    On average pain is rated as 5/10.   Today the pain is rated as 7/10  The pain is continuous.  The pain is described as aching, burning and throbbing    Symptoms interfere with daily activity and sleeping.   Exacerbating factors: Lifting and moving towards the left side.    Mitigating factors nothing and heat.   He reports 8 hours of sleep per night.    Physical Therapy/Home Exercise: Yes, currently in Physical therapy    Current Pain Medications:    - tylenol    Failed Pain Medications:    - tizanidine, meloxicam, tramadol, hydrocodone    Pain Treatment Therapies:    Pain procedures:   Epidural LBP > 1 year ago.  Physical Therapy: has home  exercise program and compelted PT in 06/2022  Chiropractor: none  Acupuncture: none  TENS unit: none  Spinal decompression:   4 cervical spine surgeries  2 lumbar spine surgeries  Joint replacement: left shoulder and left hip replacement    Patient denies urinary incontinence, bowel incontinence, significant motor weakness and loss of sensations.  Patient denies any suicidal or homicidal ideations     report:  Reviewed and consistent with medication use as prescribed.    Imaging:   MRI Cervical 2022:  FINDINGS:  Postoperative changes of C1-C2 posterior fusion with bilateral pedicle screws.  Minimal anterolisthesis of C7 on T1 similar to CT examination 03/10/2022     Additional postoperative changes of anterior fusion of C5 through C7 with partial osseous fusion of the C5 through C7 vertebral bodies.  There is straightening of the normal cervical lordosis.  No acute fracture or marrow placement process.     Cord: Normal contour and caliber.  No cord signal abnormality.     Skull base and craniocervical junction: Significant soft tissue surrounding the odontoid, resulting in mild narrowing of the craniocervical junction, similar to prior.     Degenerative findings:     C2-C3: Facet arthropathy and posterior disc osteophyte complex without significant spinal canal stenosis or neural foraminal narrowing.     C3-C4: Bilateral facet arthropathy and uncovertebral spurring results in severe left and mild right neural foraminal narrowing.  No significant spinal canal stenosis.     C4-C5: Bilateral facet arthropathy and uncovertebral spurring results in severe left and moderate right neural foraminal narrowing.  Posterior disc osteophyte complex results in mild spinal canal stenosis.     C5-C6: Facet arthropathy and uncovertebral spurring results in mild bilateral neural foraminal narrowing.  No significant canal stenosis.     C6-C7: Facet arthropathy and uncovertebral spurring results in moderate left and mild right  neural foraminal narrowing.  No significant spinal canal stenosis.     C7-T1: Facet arthropathy particularly left sided, uncovertebral spurring, and posterior disc osteophyte complex results in moderate bilateral neural foraminal narrowing and mild spinal canal stenosis.     Paraspinal muscles & soft tissues: Unremarkable.     Impression:     Stable postoperative changes of C1-C2 posterior fusion and anterior fusion of C5 through C7.     Mild degenerative change with severe left neural foraminal narrowing at C3-C4 and C4-C5 with additional multilevel neural foraminal narrowing.  No significant canal stenosis.     Similar appearance of soft tissue pannus posterior to the odontoid resulting in at most mild narrowing at the craniocervical junction.    Past Medical History:   Diagnosis Date    Gout     Hyperlipidemia     Hypertension     Myocardial infarction      Past Surgical History:   Procedure Laterality Date    CARPAL TUNNEL RELEASE Bilateral     cervical fusion      X 3    FUSION OF SPINE WITH INSTRUMENTATION N/A 2021    Procedure: Posterior C1-2 Fusion;  Surgeon: Isidro Aguilar MD;  Location: [facility];  Service: Neurosurgery;  Laterality: N/A;  Anesthesia; General  Blood: Type and Screen  NM: EMG, SEP, MEP  Rad: C-Arm  Brace: Leon HANNA  Bed: Reg slider w/ gel rolls  Bed Position: head to anesthesia  headrest: [place]  Misc: jayson Pruitt  Vendor: [employer]    LAMINECTOMY      ROTATOR CUFF REPAIR Left     X 2    SHOULDER SURGERY Left     TOTAL HIP ARTHROPLASTY Left 2018     Social History     Socioeconomic History    Marital status:    Tobacco Use    Smoking status: Former Smoker     Packs/day: 1.00     Years: 10.00     Pack years: 10.00     Quit date: 10/1/1991     Years since quittin.7    Smokeless tobacco: Never Used   Substance and Sexual Activity    Alcohol use: Yes     Comment: Occasionally    Drug use: No     Family History   Problem Relation Age of Onset    Rheum arthritis  Mother     Heart disease Father     Gout Father     Rheum arthritis Sister        Review of patient's allergies indicates:  No Known Allergies    Current Outpatient Medications   Medication Sig    acetaminophen (TYLENOL) 500 MG tablet Take 500 mg by mouth as needed for Pain.    alirocumab (PRALUENT PEN) 75 mg/mL PnIj Inject 75 mg into the skin every 14 (fourteen) days.    allopurinol (ZYLOPRIM) 300 MG tablet Take 300 mg by mouth once daily.    aspirin 81 MG Chew Take 81 mg by mouth.    buPROPion (WELLBUTRIN XL) 300 MG 24 hr tablet Take 300 mg by mouth once daily.    cholecalciferol, vitamin D3, (VITAMIN D3) 25 mcg (1,000 unit) capsule Take 1,000 Units by mouth once daily.    colchicine 0.6 mg tablet Take 1 tablet (0.6 mg total) by mouth once daily.    diazePAM (VALIUM) 5 MG tablet Take 1 tablet (5 mg total) by mouth every 6 (six) hours as needed (muscle spasm).    duloxetine (CYMBALTA) 60 MG capsule     gabapentin (NEURONTIN) 300 MG capsule 300 mg.     lisinopriL 10 MG tablet Take 10 mg by mouth.    meloxicam (MOBIC) 7.5 MG tablet TAKE 1 TABLET BY MOUTH EVERY DAY    metFORMIN (GLUCOPHAGE-XR) 500 MG ER 24hr tablet Take 500 mg by mouth 2 (two) times daily with meals.    metoprolol succinate (TOPROL-XL) 100 MG 24 hr tablet     multivitamin capsule Take 1 capsule by mouth once daily.    omega-3 acid ethyl esters (LOVAZA) 1 gram capsule TAKE 2 PILLS TWICE DAILY    pulse oximeter (PULSE OXIMETER) device by Apply Externally route 2 (two) times a day. Use twice daily at 8 AM and 3 PM and [facility] the [facility] as directed.    rosuvastatin (CRESTOR) 20 MG tablet Take 20 mg by mouth once daily.    tadalafiL (CIALIS) 5 MG tablet Take 5 mg by mouth once daily.    tiZANidine (ZANAFLEX) 4 MG tablet 1 2 PILL EVERY 6 8 HOURS MAX 36 MG A DAY    tiZANidine 2 mg Cap Take 2 mg by mouth.    HYDROcodone-acetaminophen (NORCO) 5-325 mg per tablet Take 1 tablet by mouth every 8 (eight) hours as needed for Pain.  "    Current Facility-Administered Medications   Medication    acetaminophen tablet 650 mg    albuterol inhaler 2 puff       REVIEW OF SYSTEMS:    GENERAL:  No weight loss, malaise or fevers.  HEENT:   No recent changes in vision or hearing  NECK:  Negative for lumps, no difficulty with swallowing.  RESPIRATORY:  Negative for cough, wheezing or shortness of breath, patient denies any recent URI.  CARDIOVASCULAR:  Negative for chest pain, leg swelling or palpitations.  GI:  Negative for abdominal discomfort, blood in stools or black stools or change in bowel habits.  MUSCULOSKELETAL:  See HPI.  SKIN:  Negative for lesions, rash, and itching.  PSYCH:  No mood disorder or recent psychosocial stressors.  Patients sleep is not disturbed secondary to pain.  HEMATOLOGY/LYMPHOLOGY:  Negative for prolonged bleeding, bruising easily or swollen nodes.  Patient is not currently taking any anti-coagulants + bruising easily   NEURO:   No history of headaches, syncope, paralysis, seizures or tremors. + headaches & tremors  All other reviewed and negative other than HPI.     OBJECTIVE:    /74   Pulse 83   Resp 18   Ht 5' 9" (1.753 m)   Wt 88.5 kg (195 lb)   BMI 28.80 kg/m²     PHYSICAL EXAMINATION:    GENERAL: Well appearing, in no acute distress, alert and oriented x3.  PSYCH:  Mood and affect appropriate.  SKIN: Skin color, texture, turgor normal, no rashes or lesions.  HEAD/FACE:  Normocephalic, atraumatic. Cranial nerves grossly intact.    NECK:   - Positive pain to palpation over the cervical paraspinous muscles. With trigger points  - Spurling  Positive.  - Positive pain with neck flexion, extension, or lateral flexion.     CV: RRR with palpation of the radial artery.  PULM: CTAB. No evidence of respiratory difficulty, symmetric chest rise.  GI:  Soft and non-tender.    MUSCULOSKELETAL:  No atrophy or tone abnormalities are noted in the UE or LE.  No deformities, edema, or skin discoloration are noted on visible " skin. Good capillary refill.    NEURO: Bilateral upper and lower extremity coordination and muscle stretch reflexes are physiologic and symmetric.      NEUROLOGICAL EXAM:  MENTAL STATUS: A x O x 3, good concentration, speech is fluent and goal directed  MEMORY: recent and remote are intact  CN: CN2-12 grossly intact  MOTOR: 5/5 in all muscle groups of the RUE but 4/5 in all muscles of the LUE  DTRs: 2+ intact symmetric  Sensation:    -no Loss of sensation in a left upper and right upper C-4, C-5, C-6, C-7 and C-8 bilaterally distribution.  [place]: absent on the bilateral side(s)  Clonus: absent on the bilateral side(s)

## 2022-07-12 ENCOUNTER — OFFICE VISIT (OUTPATIENT)
Dept: RHEUMATOLOGY | Facility: CLINIC | Age: 62
End: 2022-07-12
Payer: COMMERCIAL

## 2022-07-12 VITALS
DIASTOLIC BLOOD PRESSURE: 82 MMHG | BODY MASS INDEX: 28.35 KG/M2 | WEIGHT: 198 LBS | HEIGHT: 70 IN | SYSTOLIC BLOOD PRESSURE: 137 MMHG | HEART RATE: 72 BPM

## 2022-07-12 DIAGNOSIS — M05.80 POLYARTHRITIS WITH POSITIVE RHEUMATOID FACTOR: ICD-10-CM

## 2022-07-12 DIAGNOSIS — M10.9 GOUT, UNSPECIFIED CAUSE, UNSPECIFIED CHRONICITY, UNSPECIFIED SITE: Primary | ICD-10-CM

## 2022-07-12 DIAGNOSIS — M19.90 ARTHRITIS: ICD-10-CM

## 2022-07-12 DIAGNOSIS — R76.8 RHEUMATOID FACTOR POSITIVE: ICD-10-CM

## 2022-07-12 PROCEDURE — 1159F MED LIST DOCD IN RCRD: CPT | Mod: CPTII,S$GLB,, | Performed by: INTERNAL MEDICINE

## 2022-07-12 PROCEDURE — 99204 OFFICE O/P NEW MOD 45 MIN: CPT | Mod: S$GLB,,, | Performed by: INTERNAL MEDICINE

## 2022-07-12 PROCEDURE — 99999 PR PBB SHADOW E&M-EST. PATIENT-LVL V: ICD-10-PCS | Mod: PBBFAC,,, | Performed by: INTERNAL MEDICINE

## 2022-07-12 PROCEDURE — 3079F PR MOST RECENT DIASTOLIC BLOOD PRESSURE 80-89 MM HG: ICD-10-PCS | Mod: CPTII,S$GLB,, | Performed by: INTERNAL MEDICINE

## 2022-07-12 PROCEDURE — 3079F DIAST BP 80-89 MM HG: CPT | Mod: CPTII,S$GLB,, | Performed by: INTERNAL MEDICINE

## 2022-07-12 PROCEDURE — 4010F ACE/ARB THERAPY RXD/TAKEN: CPT | Mod: CPTII,S$GLB,, | Performed by: INTERNAL MEDICINE

## 2022-07-12 PROCEDURE — 3008F BODY MASS INDEX DOCD: CPT | Mod: CPTII,S$GLB,, | Performed by: INTERNAL MEDICINE

## 2022-07-12 PROCEDURE — 1159F PR MEDICATION LIST DOCUMENTED IN MEDICAL RECORD: ICD-10-PCS | Mod: CPTII,S$GLB,, | Performed by: INTERNAL MEDICINE

## 2022-07-12 PROCEDURE — 3008F PR BODY MASS INDEX (BMI) DOCUMENTED: ICD-10-PCS | Mod: CPTII,S$GLB,, | Performed by: INTERNAL MEDICINE

## 2022-07-12 PROCEDURE — 3075F SYST BP GE 130 - 139MM HG: CPT | Mod: CPTII,S$GLB,, | Performed by: INTERNAL MEDICINE

## 2022-07-12 PROCEDURE — 99999 PR PBB SHADOW E&M-EST. PATIENT-LVL V: CPT | Mod: PBBFAC,,, | Performed by: INTERNAL MEDICINE

## 2022-07-12 PROCEDURE — 4010F PR ACE/ARB THEARPY RXD/TAKEN: ICD-10-PCS | Mod: CPTII,S$GLB,, | Performed by: INTERNAL MEDICINE

## 2022-07-12 PROCEDURE — 99204 PR OFFICE/OUTPT VISIT, NEW, LEVL IV, 45-59 MIN: ICD-10-PCS | Mod: S$GLB,,, | Performed by: INTERNAL MEDICINE

## 2022-07-12 PROCEDURE — 3075F PR MOST RECENT SYSTOLIC BLOOD PRESS GE 130-139MM HG: ICD-10-PCS | Mod: CPTII,S$GLB,, | Performed by: INTERNAL MEDICINE

## 2022-07-12 ASSESSMENT — ROUTINE ASSESSMENT OF PATIENT INDEX DATA (RAPID3)
TOTAL RAPID3 SCORE: 5.22
PAIN SCORE: 7
WHEN YOU AWAKENED IN THE MORNING OVER THE LAST WEEK, PLEASE INDICATE THE AMOUNT OF TIME IT TAKES UNTIL YOU ARE AS LIMBER AS YOU WILL BE FOR THE DAY: 1
MDHAQ FUNCTION SCORE: 0.8
AM STIFFNESS SCORE: 1, YES
PSYCHOLOGICAL DISTRESS SCORE: 4.4
FATIGUE SCORE: 7
PATIENT GLOBAL ASSESSMENT SCORE: 6

## 2022-07-12 NOTE — PROGRESS NOTES
Subjective:       Patient ID: Turner Silva is a 62 y.o. male.     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 Patient underwent C1/C2 fusion to stabilize the area. He reports balance and falls have improved as well as the tumor size has decreased. Patient reports a family history of RA (mom and sister) with a history of being diagnosed with RA but then told he didn't have it. Patient reports morning stiffness in multiple joints (LE>UE) every morning which lasts for about 30 minutes. Also reports muscle cramps in fingers several times during the day and leg cramps that occur with use. He reports daily dry eyes that require the use of eye drops.    PMH: Gout, OA, Hypertension, Hyperlipidemia, Prediabetes, COVID (02/2021)    PSH: Retired, worked in a refinery; Stopped smoking 30 years ago; No current exercise routine      Patient has no known allergies.    Past Surgical History:   Procedure Laterality Date    CARPAL TUNNEL RELEASE Bilateral     cervical fusion      X 3    FUSION OF SPINE WITH INSTRUMENTATION N/A 8/2/2021    Procedure: Posterior C1-2 Fusion;  Surgeon: Isidro Aguilar MD;  Location: [facility];  Service: Neurosurgery;  Laterality: N/A;  Anesthesia; General  Blood: Type and Screen  NM: EMG, SEP, MEP  Rad: C-Arm  Brace: [place] CYNDI  Bed: Reg slider w/ gel rolls  Bed Position: head to anesthesia  headrest: [place]  Misc: jayson Pruitt  Vendor: [employer]    LAMINECTOMY      ROTATOR CUFF REPAIR Left     X 2    SHOULDER SURGERY Left     TOTAL HIP ARTHROPLASTY Left 2018       Review of Systems   Constitutional: Negative for fever, chills, unexpected weight changes  HENT: Negative for mouth sores.    Eyes: Negative for visual disturbance.   Respiratory: Negative for cough, shortness of breath, and wheezing.    Cardiovascular: Negative for chest pain and palpitations.   Gastrointestinal:  "Negative for abdominal pain, anal bleeding, blood in stool, constipation, diarrhea, nausea and vomiting.   Genitourinary: Negative for dysuria, frequency and urgency.   Musculoskeletal:Postive for arthralgia  Skin: Negative for rash.   Neurological: Negative for weakness, Reports numbness tingling left LE   Hematological: Negative for adenopathy. Reports bruise easily.   Psychiatric/Behavioral: The patient is not nervous/anxious.           Objective:   Blood pressure 137/82, pulse 72, height 5' 9.6" (1.768 m), weight 89.8 kg (198 lb).       Physical Exam   Constitutional: She is oriented to person, place, and time.   Head: Normocephalic and atraumatic.   Mouth/Throat: Oropharynx is clear and moist.   Eyes: Conjunctivae are normal.   Neck: No thyromegaly present.   Cardiovascular: Regular rate and rhythm and normal heart sounds. Exam reveals no gallop and no friction rub.  Pulmonary/Chest: CTA bilaterally, no rales or wheezes  Musculoskeletal: Decreased cx ROM (rot>SB>flex/ext);  - left shoulder flexion limited to ~100 degrees  Lymphadenopathy:   She has no cervical adenopathy.   Neurological: She is alert and oriented to person, place, and time.  Gait normal.          Assessment:   -osteoarthritis  -gout  -evaluation for rheumatoid arthritis  -history of odontoid pseudotumor  -history of C1/C2 fusion                      Plan:   Labs: Sed rate, CRP, CBC, CMP, Uric acid, Rheumatoid Factor, CCP antibody, anti carbamylated protein  -F/U in clinic in 1 month        "

## 2022-07-12 NOTE — PROGRESS NOTES
I have personally taken the history and examined the patient and agree with the resident,s note as stated above       11/15/11 1145    Result status: Final   Resulting lab: RAY LIS   Value: Positive for crystals Abnormal     Comment: Crystals:   Uric acid crystals are present.   Signed: Chetan Ramirez M.D., Pathologist       /21:  CONCLUSIONS     NSR 70 bpm     normal right heart size       mild TR / PAsys ~ 24 mmHg     LA appears enlarged / LAD 47 mm     mild MAC / minimal MR     LVH / EF ~ 55%     diastolic dysfunction         grade I with elevation LVEDP     aortic annulus /valve sclerosis - three leaflet     aortic stenosis - mild / reduced mobility RCC     AVG mean 15 mmHg / peak 26 mmHg     AV peak velocity 2.6 m/sec     moderate  AR            Domo Kerut    (Electronically Signed)     Final Date: 26 July 2021 07:40       MRI C-spine 5/30/22  Impression:     Stable postoperative changes of C1-C2 posterior fusion and anterior fusion of C5 through C7.     Mild degenerative change with severe left neural foraminal narrowing at C3-C4 and C4-C5 with additional multilevel neural foraminal narrowing.  No significant canal stenosis.     Similar appearance of soft tissue pannus posterior to the odontoid resulting in at most mild narrowing at the craniocervical junction.     Electronically signed by resident: Debbi Teran  Date:                                            05/30/2022  Time:                                           15:21     Electronically signed by: Azael Al MD  Date:                                            05/30/2022  Time:                                           15:56    CLINICAL HISTORY:  evaluate bony fusion;Arthrodesis status     TECHNIQUE:  Low dose axial images, sagittal and coronal reformations were performed though the cervical spine.  Contrast was not administered.     COMPARISON:  CT cervical spine 03/10/2022; MRI cervical spine 05/30/2022     FINDINGS:  Postoperative  changes from C1-2 posterior instrumented fusion with bilateral vertical rods and lateral mass screws.  Hardware is intact and in satisfactory position without evidence of loosening or perihardware fracture.  Unchanged alignment.  No fluid collections in the operative bed, noting that evaluation is partially degraded by streak artifact.     Remote postoperative changes from C5-7 ACDF.  No hardware complication.  Unchanged alignment.  There is solid osseous bridging across the disc spaces.     Straightening of the normal cervical lordosis.  Minimal anterolisthesis at C7-T1.     No fractures.  No focal osseous lesions.     Multilevel disc and uncovertebral joint degeneration in the unfused levels, most advanced at C7-T1.  Advanced multilevel facet arthropathy in the unfused levels.  There are atherosclerosis across multiple facet joints, for example at C2-3 bilaterally.  Advanced degenerative changes also involve the atlantodental joint, with extensive bony proliferation.  Refer to recent MRI cervical spine for assessment of the spinal canal and neural foramina.     Visualized structures in the posterior fossa are unremarkable.  There are nonaggressive erosive changes of the dens, with extensive pannus formation.  No basilar invagination.  The cervical spinal cord is difficult to visualize.     Paraspinal soft tissues are unremarkable. Lung apices are clear.     Impression:     Postoperative changes from C1-2 posterior instrumented fusion, and more remote C5-7 ACDF.  Unchanged alignment.  No hardware complication.     Nonaggressive erosions involving the dens with extensive pannus formation and ankylosis of several facet joints, compatible with known rheumatoid arthritis.        Electronically signed by: Perez Munoz  Date:                                            06/14/2022  Time:                                           14:08      S/p C1-2 posterior spinal fusion 8/2/21 Dr. Aguilar with left neck pain and decreased  dexterity left hand, seeing Pain Management for injections   recommended by Dr. Aguilar.   erosions of the dens with extensive pannus formation and ankylosis of facet joints  Remote C5-7 ACDF  H/o RA 10/25/11 RF pos  28(15) ACPA neg, then repeat RF, ACPA 1/11/17 both neg no tender or swollen joints on exam today. Not able to diagnose RA based on today's exam.  *documented polyarticular tophaceous gout SUA 9 2/18/17 most recent 2.7 3/3/18 saw Dr. Celis in Rheumatology 2/8/17 and started on allopurinol 300mg(whihe he continues) and colchicine 0.6 mg daily  Family history of RA mother and sister              CBC, CMP, ESR, CRP, uric acid,  RF,  ACPA anti-CarP  X-ray hands and feet  Continue allopurinol 300mg daily  RTC 4 wks.

## 2022-07-14 ENCOUNTER — TELEPHONE (OUTPATIENT)
Dept: PAIN MEDICINE | Facility: CLINIC | Age: 62
End: 2022-07-14
Payer: MEDICARE

## 2022-07-14 NOTE — TELEPHONE ENCOUNTER
Staff spoke with the patient regarding their procedure with Dr. Nayak scheduled on 07/15/2022; the patient was provided the Arrival Information and scheduled time 07:00AM.     Staff left vm for patient with specific instructions and time for procedure tomorrow.      Thank you,

## 2022-07-15 ENCOUNTER — HOSPITAL ENCOUNTER (OUTPATIENT)
Facility: HOSPITAL | Age: 62
Discharge: HOME | End: 2022-07-15
Attending: STUDENT IN AN ORGANIZED HEALTH CARE EDUCATION/TRAINING PROGRAM | Admitting: STUDENT IN AN ORGANIZED HEALTH CARE EDUCATION/TRAINING PROGRAM
Payer: MEDICARE

## 2022-07-15 VITALS
TEMPERATURE: 98 F | HEIGHT: 70 IN | SYSTOLIC BLOOD PRESSURE: 139 MMHG | RESPIRATION RATE: 18 BRPM | HEART RATE: 65 BPM | WEIGHT: 198 LBS | DIASTOLIC BLOOD PRESSURE: 77 MMHG | BODY MASS INDEX: 28.35 KG/M2 | OXYGEN SATURATION: 95 %

## 2022-07-15 DIAGNOSIS — M54.12 CERVICAL RADICULOPATHY: Primary | ICD-10-CM

## 2022-07-15 LAB
CTP QC/QA: YES
POCT GLUCOSE: 116 MG/DL (ref 70–110)
SARS-COV-2 AG RESP QL IA.RAPID: NEGATIVE

## 2022-07-15 PROCEDURE — 25500020 PHARM REV CODE 255: Performed by: STUDENT IN AN ORGANIZED HEALTH CARE EDUCATION/TRAINING PROGRAM

## 2022-07-15 PROCEDURE — 94761 N-INVAS EAR/PLS OXIMETRY MLT: CPT | Mod: 59

## 2022-07-15 PROCEDURE — 82962 GLUCOSE BLOOD TEST: CPT | Performed by: STUDENT IN AN ORGANIZED HEALTH CARE EDUCATION/TRAINING PROGRAM

## 2022-07-15 PROCEDURE — 62321 NJX INTERLAMINAR CRV/THRC: CPT | Mod: ,,, | Performed by: STUDENT IN AN ORGANIZED HEALTH CARE EDUCATION/TRAINING PROGRAM

## 2022-07-15 PROCEDURE — 62321 NJX INTERLAMINAR CRV/THRC: CPT | Performed by: STUDENT IN AN ORGANIZED HEALTH CARE EDUCATION/TRAINING PROGRAM

## 2022-07-15 PROCEDURE — 62321 PR INJ CERV/THORAC, W/GUIDANCE: ICD-10-PCS | Mod: ,,, | Performed by: STUDENT IN AN ORGANIZED HEALTH CARE EDUCATION/TRAINING PROGRAM

## 2022-07-15 PROCEDURE — 25000003 PHARM REV CODE 250: Performed by: STUDENT IN AN ORGANIZED HEALTH CARE EDUCATION/TRAINING PROGRAM

## 2022-07-15 PROCEDURE — 99153 MOD SED SAME PHYS/QHP EA: CPT | Performed by: STUDENT IN AN ORGANIZED HEALTH CARE EDUCATION/TRAINING PROGRAM

## 2022-07-15 PROCEDURE — 99152 MOD SED SAME PHYS/QHP 5/>YRS: CPT | Performed by: STUDENT IN AN ORGANIZED HEALTH CARE EDUCATION/TRAINING PROGRAM

## 2022-07-15 PROCEDURE — 63600175 PHARM REV CODE 636 W HCPCS: Performed by: STUDENT IN AN ORGANIZED HEALTH CARE EDUCATION/TRAINING PROGRAM

## 2022-07-15 RX ORDER — LIDOCAINE HYDROCHLORIDE 10 MG/ML
INJECTION INFILTRATION; PERINEURAL
Status: DISCONTINUED | OUTPATIENT
Start: 2022-07-15 | End: 2022-07-15 | Stop reason: HOSPADM

## 2022-07-15 RX ORDER — DEXAMETHASONE SODIUM PHOSPHATE 4 MG/ML
INJECTION, SOLUTION INTRA-ARTICULAR; INTRALESIONAL; INTRAMUSCULAR; INTRAVENOUS; SOFT TISSUE
Status: DISCONTINUED | OUTPATIENT
Start: 2022-07-15 | End: 2022-07-15 | Stop reason: HOSPADM

## 2022-07-15 RX ORDER — MIDAZOLAM HYDROCHLORIDE 1 MG/ML
2 INJECTION INTRAMUSCULAR; INTRAVENOUS ONCE AS NEEDED
Status: COMPLETED | OUTPATIENT
Start: 2022-07-15 | End: 2022-07-15

## 2022-07-15 RX ORDER — FENTANYL CITRATE 50 UG/ML
25 INJECTION, SOLUTION INTRAMUSCULAR; INTRAVENOUS ONCE AS NEEDED
Status: COMPLETED | OUTPATIENT
Start: 2022-07-15 | End: 2022-07-15

## 2022-07-15 RX ORDER — SODIUM CHLORIDE 9 MG/ML
500 INJECTION, SOLUTION INTRAVENOUS CONTINUOUS
Status: ACTIVE | OUTPATIENT
Start: 2022-07-15

## 2022-07-15 NOTE — DISCHARGE SUMMARY
[facility] - Surgery (Hospital)  Discharge Note  Short Stay    Procedure(s) (LRB):  JUAN cervical (N/A)    OUTCOME: Patient tolerated treatment/procedure well without complication and is now ready for discharge.    DISPOSITION: Home or Self Care    FINAL DIAGNOSIS:  <principal problem not specified>    FOLLOWUP: In clinic    DISCHARGE INSTRUCTIONS:  No discharge procedures on file.     TIME SPENT ON DISCHARGE: 10 minutes

## 2022-07-15 NOTE — PATIENT INSTRUCTIONS
[facility]  [facility]  Parenting[employer]former service # [phone]    POST-PROCEDURE INSTRUCTIONS:    Today you had an injection that included a steroid medications.  The steroid may or may not have been mixed with a local anesthetic when it was injected.   If the injection was in the neck, you may feel some pressure, numbness, or slight weakness in the arm after the procedure for a short period of time (this is a normal response), if this persists for longer than 1 day please contact our office or go to the emergency room.  If the injection was in the low back, you may feel some pressure, numbness, or slight weakness in the leg after the procedure for a short period of time (this is a normal response), if this persists for longer than 1 day please contact our office or go to the emergency room.  You may get side effects from the steroid.  This is not uncommon.  Symptoms include: elevated blood sugar, elevated blood pressure, headache, flushing, nausea, insomnia.  These symptoms are transient and will resolve within 1-3 days.  If symptoms last longer than this please contact our office or head to the emergency room.  Steroid medications can take anywhere from 3-14 days to take effect (rarely longer).  You may notice that your pain worsens for a short period of time after the injection, this would not be unusual due to the pressure and trauma from the needle.    If you do not have a follow up appointment scheduled, please contact our office to get a post-procedure follow up scheduled 2-3 weeks after the procedure.  This can be done as a virtual visit if that is more convenient for you.    What you need to do:    Keep a record of your response to the injection you had today.    How much relief did you get?   When did the relief start and how long did it last?  Were you able to decrease the use of any of your pain medications?  Were you able to increase your level of activity?  How long did  the relief last?    What to watch out for:    If you experience any of the following symptoms after your procedure, please notify the messaging service immediately (see above for contact information):   fever (increased oral temperature)   bleeding or swelling at the injection site,    drainage, rash or redness at the injection site    possible signs of infection    increased pain at the injection site   worsening of your usual pain   severe headache   new or worsening numbness    new arm and/or leg weakness, or    changes in bowel and/or bladder function: urinating or defecating on yourself and not knowing that you did it.    PLEASE FOLLOW ALL INSTRUCTIONS CAREFULLY     Do not engage in strenuous activity (e.g., lifting or pushing heavy objects or repeated bending) for 24 hours.     Do not take a bath, swim or use Jacuzzi for 24 hours after procedure. (A shower is fine).   Remove any Band-Aids when you get home.    Use cold/ice, as needed for comfort.  We recommend the use of cold therapy alternating on for 20 minutes, off for 20 minutes.    Do not apply direct heat (heating pad or heat packs) to the injection site for 24 hours.     Resume your usual medications, unless instructed otherwise by your Pain Physician.     If you are on warfarin (Coumadin) or other blood thinner, resume this medication as instructed by your prescribing Physician.    IF AT ANY POINT YOU ARE VERY CONCERNED ABOUT YOUR SYMPTOMS, PLEASE GO TO THE EMERGENCY ROOM.    If you develop worsening pain, weakness, numbness, lose bowel or bladder control (i.e., having an accident where you did not even know you had to go to the bathroom and suddenly noticed you soiled yourself), saddle anesthesia (a loss of sensation restricted to the area of the buttocks, anus and between the legs -- i.e., those parts of your body that would touch a saddle if you were sitting on one) you need to go immediately to the emergency department for evaluation and  treatment.    ----------------------------------------------------------------------------------------------------------------------------------------------------------------  If you received Sedation please read the following instructions:  POST SEDATION INSTRUCTIONS    Today you received intravenous medication (also known as sedation) that was used to help you relax and/or decrease discomfort during your procedure. This medication will be acting in your body for the next 24 hours, so you might feel a little tired or sleepy. This feeling will slowly wear off.   Common side effects associated with these medications include: drowsiness, dizziness, sleepiness, confusion, feeling excited, difficulty remembering things, lack of steadiness with walking or balance, loss of fine muscle control, slowed reflexes, difficulty focusing, and blurred vision.  Some over-the-counter and prescription medications (e.g., muscle relaxants, opioids, mood-altering medications, sedatives/hypnotics, antihistamines) can interact with the intravenous medication you received and cause an increased risk of the side effects listed above in addition to other potentially life threatening side effects. Use extreme caution if you are taking such medications, and consult with your Pain Physician or prescribing physician if you have any questions.  For the next 12-24 hours:    DO NOT--Drive a car, operate machinery or power tools   DO NOT--Drink any alcoholic beverages (not even beer), they may dangerously increase the risk of side effects.    DO NOT--Make any important legal or business decisions or sign important documents.  We advise you to have someone to assist you at home. Move slowly and carefully. Do not make sudden changes in position. Be aware of dizziness or light-headedness and move accordingly.   If you seek medical treatment within 24 hours, let the nurse or doctor caring for you know that you have received the above medications. If you  have any questions or concerns related to your sedation or treatment today please contact us.

## 2022-07-15 NOTE — OP NOTE
Cervical Interlaminar Epidural Steroid Injection under Fluoroscopic Guidance    The procedure, risks, benefits, and options were discussed with the patient. There are no contraindications to the procedure. The patent expressed understanding and agreed to the procedure. Informed written consent was obtained prior to the start of the procedure and can be found in the patient's chart.     PATIENT NAME: Turner Silva   MRN: [id number]     DATE OF PROCEDURE: 07/15/2022    PROCEDURE: Cervical Interlaminar Epidural Steroid Injection C7/T1 under Fluoroscopic Guidance with catheter    PRE-OP DIAGNOSIS: Cervical radiculopathy [M54.12] Cervical radiculopathy [M54.12]    POST-OP DIAGNOSIS: Same    PHYSICIAN: Tj Nayak DO     ASSISTANTS: none    MEDICATIONS INJECTED: Preservative-free Decadron 10mg with 4cc of preservative free normal saline    LOCAL ANESTHETIC INJECTED: Xylocaine 1%     SEDATION:   Versed 2mg and Fentanyl 25mcg                                                                                                                                                                                     Conscious sedation ordered by M.D. Patient re-evaluation prior to administration of conscious sedation. No changes noted in patient's status from initial evaluation. The patient's vital signs were monitored by RN and patient remained hemodynamically stable throughout the procedure.    Event Time In   Sedation Start 0955   Sedation End 1020       ESTIMATED BLOOD LOSS: None    COMPLICATIONS: None    TECHNIQUE: Time-out was performed to identify the patient and procedure to be performed. With the patient laying in a prone position, the surgical area was prepped and draped in the usual sterile fashion using ChloraPrep and a fenestrated drape. The level was determined under fluoroscopy guidance. Skin anesthesia was achieved by injecting Lidocaine 2% over the injection site.  The interlaminar space was then approached  with a 18 gauge, 3.5 inch Tuohy needle that was introduced under fluoroscopic guidance with AP, lateral and/or contralateral oblique imaging. Once the Ligamentum flavum was encountered loss of resistance to saline was used to enter the epidural space. With positive loss of resistance and negative aspiration for CSF or Blood,   the catheter was threaded through the needle into the epidural space using live fluoroscopy, contrast dye Omnipaque (300mg/mL) was injected to confirm placement and there was no vascular runoff.Then 6 mL of the medication mixture listed above was then injected slowly. Displacement of the radio opaque contrast after injection of the medication confirmed that the medication went into the area of the epidural space. The needles were removed, and bleeding was nil. A sterile dressing was applied. No specimens collected. The patient tolerated the procedure well.       The patient was monitored after the procedure in the recovery area. They were given post-procedure and discharge instructions to follow at home. The patient was discharged in a stable condition.      Tj Weir DO

## 2022-07-15 NOTE — PLAN OF CARE
Pre-op complete. Pt calm, will continue to monitor. Pt's spouse at bedside. Spouse states will keep pt's belongings.

## 2022-07-15 NOTE — PLAN OF CARE
VSS.  Patient tolerating oral liquids without difficulty.   No apparent s&s of distress noted at this time, no complaints voiced at this time.   Discharge instructions reviewed with patient and wife with good verbal feedback received.   No post op medications ordered.   Patient ready for discharge.

## 2022-07-20 ENCOUNTER — PATIENT MESSAGE (OUTPATIENT)
Dept: RHEUMATOLOGY | Facility: CLINIC | Age: 62
End: 2022-07-20
Payer: MEDICARE

## 2022-07-25 ENCOUNTER — TELEPHONE (OUTPATIENT)
Dept: RHEUMATOLOGY | Facility: CLINIC | Age: 62
End: 2022-07-25
Payer: MEDICARE

## 2022-07-25 ENCOUNTER — HOSPITAL ENCOUNTER (OUTPATIENT)
Dept: RADIOLOGY | Facility: HOSPITAL | Age: 62
Discharge: HOME | End: 2022-07-25
Attending: INTERNAL MEDICINE
Payer: MEDICARE

## 2022-07-25 DIAGNOSIS — M10.9 GOUT, UNSPECIFIED CAUSE, UNSPECIFIED CHRONICITY, UNSPECIFIED SITE: ICD-10-CM

## 2022-07-25 DIAGNOSIS — M05.80 POLYARTHRITIS WITH POSITIVE RHEUMATOID FACTOR: ICD-10-CM

## 2022-07-25 PROCEDURE — 73630 X-RAY EXAM OF FOOT: CPT | Mod: TC,50

## 2022-07-25 PROCEDURE — 73130 X-RAY EXAM OF HAND: CPT | Mod: TC,50

## 2022-07-25 PROCEDURE — 73630 X-RAY EXAM OF FOOT: CPT | Mod: 26,50,, | Performed by: RADIOLOGY

## 2022-07-25 PROCEDURE — 73630 XR FOOT COMPLETE 3 VIEW BILATERAL: ICD-10-PCS | Mod: 26,50,, | Performed by: RADIOLOGY

## 2022-07-25 PROCEDURE — 73130 XR HAND COMPLETE 3 VIEWS BILATERAL: ICD-10-PCS | Mod: 26,50,, | Performed by: RADIOLOGY

## 2022-07-25 PROCEDURE — 73130 X-RAY EXAM OF HAND: CPT | Mod: 26,50,, | Performed by: RADIOLOGY

## 2022-07-25 NOTE — PROGRESS NOTES
Chronic Pain - New Consult    Referring Physician: No ref. provider found    Date: 07/26/2022     Re: Turner Silva  MR#: [id number]  YOB: 1960  Age: 62 y.o.    Chief Complaint:   Chief Complaint   Patient presents with    Neck Pain    Shoulder Pain     Left      **This note is dictated using the M*Modal Fluency Direct word recognition program. There are word recognition mistakes that are occasionally missed on review.**    ASSESSMENT: 62 y.o. year old male with neck and shoulder/arm pain, consistent with     1. Cervical radiculopathy  baclofen (LIORESAL) 10 MG tablet   2. Muscle spasm  baclofen (LIORESAL) 10 MG tablet   3. Myalgia     4. Post laminectomy syndrome     5. Weakness of left upper extremity           PLAN:     Cervical radiculopathy / DDD  -patient has had 4 neck surgeries including recent C1-2 PSF and C5-7 ACDF.  Has left UE weakness, and symptoms of radiculopathy and DDD.  -discused trying cervical epidural injection.  Due to the presence of the hardware, it may be difficult to get an appropriate view and there is a risk that the procedure will have to be aborted due to this.  The patient is aware.  -s/p JB on 7/15/22 - improved arm pain @2w, but neck/shoulder pain remains.  - neurosurgery was not concerned about steroid interferting with the healing of the bone graft  -discussed SCS and treatment option for pain, but that it will not help his dexterity.  -Short course of Norco 5mg #21 tabs for severe pain. Patient aware that this is just for breakthrough, severe pain and should not be taken every day. Mildly helpful. No refill today.    Myalgia  -discussed series of TPI.  He would like to try this. Risks, benefits, and alternatives were discussed with the patient, and He would like to proceed.  -TPI#1 on 7/26  -TPI #2 on 8/8  TPI #3 on 8/22    Cervical dystonia  -trial baclofen 10mg TID PRN  -discussed botox. Defer for now    - RTC 2 months  - Counseled patient regarding the  importance of  activity modification.    The above plan and management options were discussed at length with patient. Patient is in agreement with the above and verbalized understanding. It will be communicated with the referring physician via electronic record, fax, or mail.  Lab/study reports reviewed were important and necessary because subsequent medical and treatment recommendations required review of the above lab/study reports. Images viewed/reviewed above were important and necessary because subsequent medical and treatment recommendations required review of the reviewed image(s).     Electronically signed by:  Tj Nayak DO  07/26/2022    =========================================================================================================    SUBJECTIVE:      Interval History 7/25/2022:     Turner Silva is a 62 y.o. male presents to the clinic for follow up.  Since last visit the pain has has improved in some ways and worsened in others.  The arm pain seems to have improved since the injection, but still having the neck pain and the trap pain.  He is still having issues with dexterity in the left hand and subjective weakness.    Hydrocodone didn't make a significant difference.    The pain is located in the neck area and radiates to the left shoulder(blade).  The pain is described as aching, burning and throbbing    At BEST  4/10   At WORST  8/10 on the WORST day.    On average pain is rated as 5/10.   Today the pain is rated as 4/10  Symptoms interfere with daily activity, sleeping and work.   Exacerbating factors: Night Time.    Mitigating factors nothing and medications.     Current pain medications: tylenol, Norco 5mg (rare)  Failed Pain Medications: tizanidine, meloxicam, tramadol, hydrocodone    Pain Procedures:  7/15/22 - JB C7-T1 - 50% improvement in the left arm, but only 0-25% in the neck/shoulder.  7/26-22 - TPI left cervical, trap, levator, rhomboid, scalene    Initial  hx:  Turner Silva is a 62 y.o. male presents to the clinic for the evaluation of neck pain. The pain started 20 years ago following no inciting event and symptoms have been worsening.  States that he has good and bad days.  Pain is in the neck and down into the left scapular and shoulder.     Pain Description:    The pain is located in the neck area and radiates to the left shoulder(blade)/ down the arm .    At BEST  6/10   At WORST  10/10 on the WORST day.    On average pain is rated as 5/10.   Today the pain is rated as 7/10  The pain is continuous.  The pain is described as aching, burning and throbbing    Symptoms interfere with daily activity and sleeping.   Exacerbating factors: Lifting and moving towards the left side.    Mitigating factors nothing and heat.   He reports 8 hours of sleep per night.    Physical Therapy/Home Exercise: Yes, currently in Physical therapy    Current Pain Medications:    - tylenol    Failed Pain Medications:    - tizanidine, meloxicam, tramadol, hydrocodone, soma    Pain Treatment Therapies:    Pain procedures:   Epidural LBP > 1 year ago.  Physical Therapy: has home exercise program and compelted PT in 06/2022  Chiropractor: none  Acupuncture: none  TENS unit: none  Spinal decompression:   4 cervical spine surgeries  2 lumbar spine surgeries  Joint replacement: left shoulder and left hip replacement    Patient denies urinary incontinence, bowel incontinence, significant motor weakness and loss of sensations.  Patient denies any suicidal or homicidal ideations     report:  Reviewed and consistent with medication use as prescribed.    Imaging:   MRI Cervical 2022:  FINDINGS:  Postoperative changes of C1-C2 posterior fusion with bilateral pedicle screws.  Minimal anterolisthesis of C7 on T1 similar to CT examination 03/10/2022     Additional postoperative changes of anterior fusion of C5 through C7 with partial osseous fusion of the C5 through C7 vertebral bodies.  There is  straightening of the normal cervical lordosis.  No acute fracture or marrow placement process.     Cord: Normal contour and caliber.  No cord signal abnormality.     Skull base and craniocervical junction: Significant soft tissue surrounding the odontoid, resulting in mild narrowing of the craniocervical junction, similar to prior.     Degenerative findings:     C2-C3: Facet arthropathy and posterior disc osteophyte complex without significant spinal canal stenosis or neural foraminal narrowing.     C3-C4: Bilateral facet arthropathy and uncovertebral spurring results in severe left and mild right neural foraminal narrowing.  No significant spinal canal stenosis.     C4-C5: Bilateral facet arthropathy and uncovertebral spurring results in severe left and moderate right neural foraminal narrowing.  Posterior disc osteophyte complex results in mild spinal canal stenosis.     C5-C6: Facet arthropathy and uncovertebral spurring results in mild bilateral neural foraminal narrowing.  No significant canal stenosis.     C6-C7: Facet arthropathy and uncovertebral spurring results in moderate left and mild right neural foraminal narrowing.  No significant spinal canal stenosis.     C7-T1: Facet arthropathy particularly left sided, uncovertebral spurring, and posterior disc osteophyte complex results in moderate bilateral neural foraminal narrowing and mild spinal canal stenosis.     Paraspinal muscles & soft tissues: Unremarkable.     Impression:     Stable postoperative changes of C1-C2 posterior fusion and anterior fusion of C5 through C7.     Mild degenerative change with severe left neural foraminal narrowing at C3-C4 and C4-C5 with additional multilevel neural foraminal narrowing.  No significant canal stenosis.     Similar appearance of soft tissue pannus posterior to the odontoid resulting in at most mild narrowing at the craniocervical junction.    Past Medical History:   Diagnosis Date    Gout     Hyperlipidemia      Hypertension     Myocardial infarction      Past Surgical History:   Procedure Laterality Date    CARPAL TUNNEL RELEASE Bilateral     cervical fusion      X 3    EPIDURAL STEROID INJECTION N/A 7/15/2022    Procedure: JUAN cervical;  Surgeon: Tj Nayak DO;  Location: [facility] OR;  Service: Pain Management;  Laterality: N/A;    FUSION OF SPINE WITH INSTRUMENTATION N/A 2021    Procedure: Posterior C1-2 Fusion;  Surgeon: Isidro Aguilar MD;  Location: [facility] OR;  Service: Neurosurgery;  Laterality: N/A;  Anesthesia; General  Blood: Type and Screen  NM: EMG, SEP, MEP  Rad: C-Arm  Brace: [place] J  Bed: Reg slider w/ gel rolls  Bed Position: head to anesthesia  headrest: [place]  Misc: jayson Pruitt  Vendor: [employer]    LAMINECTOMY      ROTATOR CUFF REPAIR Left     X 2    SHOULDER SURGERY Left     TOTAL HIP ARTHROPLASTY Left 2018     Social History     Socioeconomic History    Marital status:    Tobacco Use    Smoking status: Former Smoker     Packs/day: 1.00     Years: 10.00     Pack years: 10.00     Quit date: 10/1/1991     Years since quittin.8    Smokeless tobacco: Never Used   Substance and Sexual Activity    Alcohol use: Yes     Comment: Occasionally    Drug use: No     Family History   Problem Relation Age of Onset    Rheum arthritis Mother     Heart disease Father     Gout Father     Rheum arthritis Sister        Review of patient's allergies indicates:  No Known Allergies    Current Outpatient Medications   Medication Sig    acetaminophen (TYLENOL) 500 MG tablet Take 500 mg by mouth as needed for Pain.    alirocumab (PRALUENT PEN) 75 mg/mL PnIj Inject 75 mg into the skin every 14 (fourteen) days.    allopurinol (ZYLOPRIM) 300 MG tablet Take 300 mg by mouth once daily.    aspirin 81 MG Chew Take 81 mg by mouth.    buPROPion (WELLBUTRIN XL) 300 MG 24 hr tablet Take 300 mg by mouth once daily.    cholecalciferol, vitamin D3, (VITAMIN D3) 25 mcg (1,000 unit) capsule  Take 1,000 Units by mouth once daily.    colchicine 0.6 mg tablet Take 1 tablet (0.6 mg total) by mouth once daily.    diazePAM (VALIUM) 5 MG tablet Take 1 tablet (5 mg total) by mouth every 6 (six) hours as needed (muscle spasm).    duloxetine (CYMBALTA) 60 MG capsule     gabapentin (NEURONTIN) 300 MG capsule 300 mg.     lisinopriL 10 MG tablet Take 10 mg by mouth.    metFORMIN (GLUCOPHAGE-XR) 500 MG ER 24hr tablet Take 500 mg by mouth 2 (two) times daily with meals.    metoprolol succinate (TOPROL-XL) 100 MG 24 hr tablet     multivitamin capsule Take 1 capsule by mouth once daily.    omega-3 acid ethyl esters (LOVAZA) 1 gram capsule TAKE 2 PILLS TWICE DAILY    pulse oximeter (PULSE OXIMETER) device by Apply Externally route 2 (two) times a day. Use twice daily at 8 AM and 3 PM and record the value in [employer] as directed.    rosuvastatin (CRESTOR) 20 MG tablet Take 20 mg by mouth once daily.    tadalafiL (CIALIS) 5 MG tablet Take 5 mg by mouth once daily.    baclofen (LIORESAL) 10 MG tablet Take 1 tablet (10 mg total) by mouth 3 (three) times daily as needed (pain, spasms).     Current Facility-Administered Medications   Medication    acetaminophen tablet 650 mg    albuterol inhaler 2 puff     Facility-Administered Medications Ordered in Other Visits   Medication    0.9%  NaCl infusion       REVIEW OF SYSTEMS:    GENERAL:  No weight loss, malaise or fevers.  HEENT:   No recent changes in vision or hearing  NECK:  Negative for lumps, no difficulty with swallowing.  RESPIRATORY:  Negative for cough, wheezing or shortness of breath, patient denies any recent URI.  CARDIOVASCULAR:  Negative for chest pain, leg swelling or palpitations.  GI:  Negative for abdominal discomfort, blood in stools or black stools or change in bowel habits.  MUSCULOSKELETAL:  See HPI.  SKIN:  Negative for lesions, rash, and itching.  PSYCH:  No mood disorder or recent psychosocial stressors.  Patients sleep is not disturbed  "secondary to pain.  HEMATOLOGY/LYMPHOLOGY:  Negative for prolonged bleeding, bruising easily or swollen nodes.  Patient is not currently taking any anti-coagulants + bruising easily   NEURO:   No history of headaches, syncope, paralysis, seizures or tremors. + headaches & tremors  All other reviewed and negative other than HPI.     OBJECTIVE:    /72   Pulse 78   Resp 18   Ht 5' 9" (1.753 m)   Wt 89.8 kg (198 lb)   BMI 29.24 kg/m²     PHYSICAL EXAMINATION:    GENERAL: Well appearing, in no acute distress, alert and oriented x3.  PSYCH:  Mood and affect appropriate.  SKIN: Skin color, texture, turgor normal, no rashes or lesions.  HEAD/FACE:  Normocephalic, atraumatic. Cranial nerves grossly intact.    NECK:   - Positive pain to palpation over the cervical paraspinous muscles. With palpable trigger points  - Spurling  Positive.  - Positive pain with neck flexion, extension, or lateral flexion.   - severely limited ROM of the neck    CV: RRR with palpation of the radial artery.  PULM: CTAB. No evidence of respiratory difficulty, symmetric chest rise.  GI:  Soft and non-tender.    MUSCULOSKELETAL:  No atrophy or tone abnormalities are noted in the UE or LE.  No deformities, edema, or skin discoloration are noted on visible skin. Good capillary refill.    NEURO: Bilateral upper and lower extremity coordination and muscle stretch reflexes are physiologic and symmetric.      NEUROLOGICAL EXAM:  MENTAL STATUS: A x O x 3, good concentration, speech is fluent and goal directed  MEMORY: recent and remote are intact  CN: CN2-12 grossly intact  MOTOR: 5/5 in all muscle groups of the RUE but 4/5 in all muscles of the LUE  DTRs: 2+ intact symmetric  Sensation:    -no Loss of sensation in a left upper and right upper C-4, C-5, C-6, C-7 and C-8 bilaterally distribution.  [place]: absent on the bilateral side(s)  Clonus: absent on the bilateral side(s)      "

## 2022-07-26 ENCOUNTER — OFFICE VISIT (OUTPATIENT)
Dept: PAIN MEDICINE | Facility: CLINIC | Age: 62
End: 2022-07-26
Payer: MEDICARE

## 2022-07-26 VITALS
DIASTOLIC BLOOD PRESSURE: 72 MMHG | WEIGHT: 198 LBS | RESPIRATION RATE: 18 BRPM | SYSTOLIC BLOOD PRESSURE: 120 MMHG | BODY MASS INDEX: 29.33 KG/M2 | HEIGHT: 69 IN | HEART RATE: 78 BPM

## 2022-07-26 DIAGNOSIS — G24.3 CERVICAL DYSTONIA: ICD-10-CM

## 2022-07-26 DIAGNOSIS — M96.1 POST LAMINECTOMY SYNDROME: ICD-10-CM

## 2022-07-26 DIAGNOSIS — M62.838 MUSCLE SPASM: ICD-10-CM

## 2022-07-26 DIAGNOSIS — M79.10 MYALGIA: ICD-10-CM

## 2022-07-26 DIAGNOSIS — R29.898 WEAKNESS OF LEFT UPPER EXTREMITY: ICD-10-CM

## 2022-07-26 DIAGNOSIS — M54.12 CERVICAL RADICULOPATHY: Primary | ICD-10-CM

## 2022-07-26 PROCEDURE — 20553 NJX 1/MLT TRIGGER POINTS 3/>: CPT | Mod: S$PBB,,, | Performed by: STUDENT IN AN ORGANIZED HEALTH CARE EDUCATION/TRAINING PROGRAM

## 2022-07-26 PROCEDURE — 99215 PR OFFICE/OUTPT VISIT, EST, LEVL V, 40-54 MIN: ICD-10-PCS | Mod: 25,S$PBB,, | Performed by: STUDENT IN AN ORGANIZED HEALTH CARE EDUCATION/TRAINING PROGRAM

## 2022-07-26 PROCEDURE — 20553 NJX 1/MLT TRIGGER POINTS 3/>: CPT | Mod: PBBFAC | Performed by: STUDENT IN AN ORGANIZED HEALTH CARE EDUCATION/TRAINING PROGRAM

## 2022-07-26 PROCEDURE — 99215 OFFICE O/P EST HI 40 MIN: CPT | Mod: 25,S$PBB,, | Performed by: STUDENT IN AN ORGANIZED HEALTH CARE EDUCATION/TRAINING PROGRAM

## 2022-07-26 PROCEDURE — 20553 PR INJECT TRIGGER POINTS, > 3: ICD-10-PCS | Mod: S$PBB,,, | Performed by: STUDENT IN AN ORGANIZED HEALTH CARE EDUCATION/TRAINING PROGRAM

## 2022-07-26 PROCEDURE — 99999 PR PBB SHADOW E&M-EST. PATIENT-LVL V: ICD-10-PCS | Mod: PBBFAC,,, | Performed by: STUDENT IN AN ORGANIZED HEALTH CARE EDUCATION/TRAINING PROGRAM

## 2022-07-26 PROCEDURE — 99215 OFFICE O/P EST HI 40 MIN: CPT | Mod: PBBFAC,25 | Performed by: STUDENT IN AN ORGANIZED HEALTH CARE EDUCATION/TRAINING PROGRAM

## 2022-07-26 PROCEDURE — 99999 PR PBB SHADOW E&M-EST. PATIENT-LVL V: CPT | Mod: PBBFAC,,, | Performed by: STUDENT IN AN ORGANIZED HEALTH CARE EDUCATION/TRAINING PROGRAM

## 2022-07-26 RX ORDER — BACLOFEN 10 MG/1
10 TABLET ORAL 3 TIMES DAILY PRN
Qty: 90 TABLET | Refills: 3 | Status: SHIPPED | OUTPATIENT
Start: 2022-07-26 | End: 2022-08-08 | Stop reason: SDUPTHER

## 2022-07-27 NOTE — PROCEDURES
"Procedures   PROCEDURE: Trigger Point Injections - Location of Myofascial Pain - left Scalenes, CERVICAL Paraspinals (Splenius Capitis, Semispinalis Capitis, Longissimus Capitis), Trapezius, Levator Scapulae and Rhomboid Minor    PATIENT NAME: Turner Silva   MRN: [id number]     DATE OF PROCEDURE: 07/26/2022    Diagnosis: Myalgia (M79.1)  CPT code: 29191 (Injection(s); single or multiple trigger point(s), 3 or more muscles)    Injection # 1 this year.    Postprocedural Diagnosis: Same  Needle Type: - 27G 1.5" needle    Solution injected: A 10ml mixture of 0.25% Bupivacaine  Volume Injected for each Trigger Point: 2ml  Number of Trigger Point Injected: 5    Estimated Blood Loss - <2ml  Drains: None  Specimens Removed: None  Urine Output - Not Measured  Complications: None  Outcome: Good  VAS Before Injection:  6/10  VAS After Injection:  4/10    Informed Consent:  The patient's condition and proposed procedures, risks (including complications of nerve damage,  bleeding, infection, and failure of pain relief), and alternatives were discussed with the patient or responsible party.  The patient's/responsible party's questions were answered.  The patient/responsible party appeared to understand and chose to proceed.  Informed consent was obtained.    Procedure in Detail:  The procedure was performed in the procedure treatment room.  After obtaining written consent, the patient was placed in a seated position. By palpation the above noted number of trigger points were identified that reproduced the patient's typical radiating pain pattern. The trigger points were located in the above noted muscles. Each of the target sites of injection was prepped using an antiseptic solution.     Procedural Pause:  A procedural pause verifying correct patient, medical record number, allergies, medications to be administered, current vital signs, and surgical site was performed immediately prior to beginning the procedure.    The above " noted needle was advanced towards each trigger point until the patient's typical radiating pain pattern was reproduced. After negative aspiration for heme, the above noted solution was injected in a fanned-out distribution at each trigger point. The needle(s) was then removed.     All sites were done in the same manner. The patient tolerated the procedure well and without complications. After meeting discharge criteria, the patient was discharged home safely.    DISCUSSION:  Trigger point injections were performed today to treat the patients myofascial pain. The purpose was to improve the patients function and decrease pain. We have reminded the patient that the trigger point injections must be done in conjunction with a stretching program.  Without a stretching program, results from trigger point injections are often suboptimal.  The patient was advised to relax and avoid any heavy lifting or excessive bending for 24 hours. He was advised that he may return to his usual activities after 24 hours if he is otherwise feeling well.  The patient was advised not to bathe or soak in water for 24 hours but that showering would be acceptable.      Note Electronically Signed By:  Tj Weir  07/26/2022

## 2022-08-04 ENCOUNTER — OFFICE VISIT (OUTPATIENT)
Dept: URGENT CARE | Facility: CLINIC | Age: 62
End: 2022-08-04
Payer: MEDICARE

## 2022-08-04 VITALS
DIASTOLIC BLOOD PRESSURE: 83 MMHG | TEMPERATURE: 99 F | SYSTOLIC BLOOD PRESSURE: 152 MMHG | OXYGEN SATURATION: 96 % | WEIGHT: 198 LBS | BODY MASS INDEX: 29.33 KG/M2 | HEART RATE: 68 BPM | RESPIRATION RATE: 15 BRPM | HEIGHT: 69 IN

## 2022-08-04 DIAGNOSIS — R81 GLUCOSURIA: ICD-10-CM

## 2022-08-04 DIAGNOSIS — R10.9 FLANK PAIN: Primary | ICD-10-CM

## 2022-08-04 LAB
BILIRUB UR QL STRIP: NEGATIVE
GLUCOSE SERPL-MCNC: 77 MG/DL (ref 70–110)
GLUCOSE UR QL STRIP: POSITIVE
KETONES UR QL STRIP: NEGATIVE
LEUKOCYTE ESTERASE UR QL STRIP: NEGATIVE
PH, POC UA: 5
POC BLOOD, URINE: NEGATIVE
POC NITRATES, URINE: NEGATIVE
PROT UR QL STRIP: NEGATIVE
SP GR UR STRIP: 1.02 (ref 1–1.03)
UROBILINOGEN UR STRIP-ACNC: ABNORMAL (ref 0.3–2.2)

## 2022-08-04 PROCEDURE — 3008F BODY MASS INDEX DOCD: CPT | Mod: CPTII,S$GLB,, | Performed by: FAMILY MEDICINE

## 2022-08-04 PROCEDURE — 87086 URINE CULTURE/COLONY COUNT: CPT | Performed by: FAMILY MEDICINE

## 2022-08-04 PROCEDURE — 3079F DIAST BP 80-89 MM HG: CPT | Mod: CPTII,S$GLB,, | Performed by: FAMILY MEDICINE

## 2022-08-04 PROCEDURE — 99214 PR OFFICE/OUTPT VISIT, EST, LEVL IV, 30-39 MIN: ICD-10-PCS | Mod: 25,S$GLB,, | Performed by: FAMILY MEDICINE

## 2022-08-04 PROCEDURE — 82962 POCT GLUCOSE, HAND-HELD DEVICE: ICD-10-PCS | Mod: S$GLB,,, | Performed by: FAMILY MEDICINE

## 2022-08-04 PROCEDURE — 96372 PR INJECTION,THERAP/PROPH/DIAG2ST, IM OR SUBCUT: ICD-10-PCS | Mod: S$GLB,,, | Performed by: FAMILY MEDICINE

## 2022-08-04 PROCEDURE — 3079F PR MOST RECENT DIASTOLIC BLOOD PRESSURE 80-89 MM HG: ICD-10-PCS | Mod: CPTII,S$GLB,, | Performed by: FAMILY MEDICINE

## 2022-08-04 PROCEDURE — 4010F ACE/ARB THERAPY RXD/TAKEN: CPT | Mod: CPTII,S$GLB,, | Performed by: FAMILY MEDICINE

## 2022-08-04 PROCEDURE — 99214 OFFICE O/P EST MOD 30 MIN: CPT | Mod: 25,S$GLB,, | Performed by: FAMILY MEDICINE

## 2022-08-04 PROCEDURE — 3008F PR BODY MASS INDEX (BMI) DOCUMENTED: ICD-10-PCS | Mod: CPTII,S$GLB,, | Performed by: FAMILY MEDICINE

## 2022-08-04 PROCEDURE — 82962 GLUCOSE BLOOD TEST: CPT | Mod: S$GLB,,, | Performed by: FAMILY MEDICINE

## 2022-08-04 PROCEDURE — 1159F PR MEDICATION LIST DOCUMENTED IN MEDICAL RECORD: ICD-10-PCS | Mod: CPTII,S$GLB,, | Performed by: FAMILY MEDICINE

## 2022-08-04 PROCEDURE — 81003 URINALYSIS AUTO W/O SCOPE: CPT | Mod: QW,S$GLB,, | Performed by: FAMILY MEDICINE

## 2022-08-04 PROCEDURE — 4010F PR ACE/ARB THEARPY RXD/TAKEN: ICD-10-PCS | Mod: CPTII,S$GLB,, | Performed by: FAMILY MEDICINE

## 2022-08-04 PROCEDURE — 81003 POCT URINALYSIS, DIPSTICK, AUTOMATED, W/O SCOPE: ICD-10-PCS | Mod: QW,S$GLB,, | Performed by: FAMILY MEDICINE

## 2022-08-04 PROCEDURE — 3077F SYST BP >= 140 MM HG: CPT | Mod: CPTII,S$GLB,, | Performed by: FAMILY MEDICINE

## 2022-08-04 PROCEDURE — 1159F MED LIST DOCD IN RCRD: CPT | Mod: CPTII,S$GLB,, | Performed by: FAMILY MEDICINE

## 2022-08-04 PROCEDURE — 96372 THER/PROPH/DIAG INJ SC/IM: CPT | Mod: S$GLB,,, | Performed by: FAMILY MEDICINE

## 2022-08-04 PROCEDURE — 3077F PR MOST RECENT SYSTOLIC BLOOD PRESSURE >= 140 MM HG: ICD-10-PCS | Mod: CPTII,S$GLB,, | Performed by: FAMILY MEDICINE

## 2022-08-04 RX ORDER — TIZANIDINE 4 MG/1
4 TABLET ORAL EVERY 6 HOURS PRN
COMMUNITY
End: 2022-08-08

## 2022-08-04 RX ORDER — DICLOFENAC SODIUM 10 MG/G
2 GEL TOPICAL 4 TIMES DAILY
Qty: 20 G | Refills: 0 | Status: SHIPPED | OUTPATIENT
Start: 2022-08-04

## 2022-08-04 RX ORDER — HYDROCODONE BITARTRATE AND ACETAMINOPHEN 5; 325 MG/1; MG/1
1 TABLET ORAL EVERY 8 HOURS PRN
COMMUNITY
End: 2022-08-08 | Stop reason: SDUPTHER

## 2022-08-04 RX ORDER — KETOROLAC TROMETHAMINE 30 MG/ML
15 INJECTION, SOLUTION INTRAMUSCULAR; INTRAVENOUS
Status: COMPLETED | OUTPATIENT
Start: 2022-08-04 | End: 2022-08-04

## 2022-08-04 RX ORDER — LIDOCAINE 50 MG/G
1 PATCH TOPICAL DAILY
Qty: 7 PATCH | Refills: 0 | Status: SHIPPED | OUTPATIENT
Start: 2022-08-04 | End: 2022-08-11

## 2022-08-04 RX ORDER — MELOXICAM 15 MG/1
15 TABLET ORAL DAILY
COMMUNITY
Start: 2022-07-06

## 2022-08-04 RX ADMIN — KETOROLAC TROMETHAMINE 15 MG: 30 INJECTION, SOLUTION INTRAMUSCULAR; INTRAVENOUS at 07:08

## 2022-08-04 NOTE — PROGRESS NOTES
"Subjective:       Patient ID: Turner Silva is a 62 y.o. male.    Vitals:  height is 5' 9" (1.753 m) and weight is 89.8 kg (198 lb). His temperature is 98.6 °F (37 °C). His blood pressure is 152/83 (abnormal) and his pulse is 68. His respiration is 15 and oxygen saturation is 96%.     Chief Complaint: Flank Pain    Pt c/o R flank pn that started about a week ago and has gotten worse the past couple days. Pt sts he noticed yesterday he had cloudy urine last night. Denies any dysuria, frequency, urgency, hematuria, or abd pn. Pt sts he takes pain meds and muscle relaxer's regularly but no meds for the flank pain. Pt reports having kidney stones in the past.   Provider note begins below:  Pt reports he started with right flank pain x 1 week, hx of chronic back and neck pain but this is different. He has hx of kidney stones and last had severe pain years ago but he will pass large stones. He did have dysuria x 1, denies passing any stones that he is aware of. He is unsure if this is similar to previous stones. Denies any NVD. He noticed cloudy urine yesterday and today, he has been drinking a lot of water, feels like he is not peeing as much as he usually does, but notes hx of enlarged prostate so this is normal for him. Has an appt with pain mgmt Monday. He took flomax in the past and says causing him to pass out. Bending over to tie his shoe can worsen the pain. Reports he was told pre-dm not taking any medications.     Flank Pain  This is a new problem. The current episode started in the past 7 days. The problem occurs intermittently. The problem is unchanged. The pain is present in the lumbar spine. The quality of the pain is described as stabbing. The pain is at a severity of 7/10. Pertinent negatives include no abdominal pain, bladder incontinence, bowel incontinence, chest pain, dysuria, fever, headaches, leg pain, numbness, paresis, paresthesias, pelvic pain, perianal numbness, tingling, weakness or weight " loss. He has tried muscle relaxant and NSAIDs (tyelnol # 3) for the symptoms. The treatment provided mild relief.       Constitution: Negative for activity change, appetite change, chills, sweating, fatigue, fever, unexpected weight change and generalized weakness.   Cardiovascular: Negative for chest pain.   Gastrointestinal: Negative for abdominal pain, nausea, vomiting, constipation, diarrhea and bowel incontinence.   Genitourinary: Positive for urine decreased, flank pain and history of kidney stones. Negative for dysuria, frequency, urgency, bladder incontinence, bed wetting, hematuria and pelvic pain.   Musculoskeletal: Positive for arthritis, back pain and history of spine disorder. Negative for pain, trauma, joint pain, joint swelling, abnormal ROM of joint, gout, pain with walking, muscle cramps and muscle ache.   Neurological: Negative for headaches and numbness.       Objective:      Physical Exam   Constitutional: He is oriented to person, place, and time. He appears well-developed.  Non-toxic appearance. He does not appear ill. No distress.   HENT:   Head: Normocephalic and atraumatic.   Ears:   Right Ear: External ear normal.   Left Ear: External ear normal.   Nose: Nose normal.   Mouth/Throat: Oropharynx is clear and moist.   Eyes: Conjunctivae, EOM and lids are normal. Pupils are equal, round, and reactive to light.   Neck: Trachea normal and phonation normal. Neck supple.   Abdominal: Bowel sounds are normal. Soft. There is no abdominal tenderness. There is no rebound, no guarding, no tenderness at McBurney's point, negative Jackson's sign, no left CVA tenderness, negative Rovsing's sign, negative psoas sign, no right CVA tenderness and negative obturator sign.      Comments: Patient able to transition from heel to toe without pain or grimacing. No sign of acute abdomen at this time, ambulatory without grimacing, Abdomen is soft there is no tenderness present.    Musculoskeletal: Normal range of  motion.         General: Normal range of motion.      Comments: Right flank pain reproduced with rotation and flexion.  Limited rom to neck at baseline.    Neurological: He is alert and oriented to person, place, and time.   Skin: Skin is warm, dry, intact and not diaphoretic.   Psychiatric: His speech is normal and behavior is normal. Judgment and thought content normal.   Nursing note and vitals reviewed.        Assessment:       1. Flank pain    2. Glucosuria        Results for orders placed or performed in visit on 08/04/22   POCT Urinalysis, Dipstick, Automated, W/O Scope   Result Value Ref Range    POC Blood, Urine Negative Negative    POC Bilirubin, Urine Negative Negative    POC Urobilinogen, Urine norm 0.3 - 2.2    POC Ketones, Urine Negative Negative    POC Protein, Urine Negative Negative    POC Nitrates, Urine Negative Negative    POC Glucose, Urine Positive (A) Negative    pH, UA 5.0     POC Specific Gravity, Urine 1.025 1.003 - 1.029    POC Leukocytes, Urine Negative Negative   POCT Glucose, Hand-Held Device   Result Value Ref Range    POC Glucose 77 70 - 110 MG/DL      Plan:       Pain reproducible with bending and rotation, possibly MSK, will try toradol inj, lidocaine patch, has fu with pain mgmt Monday.  He will hold his meloxicam, takes it nightly, discussed risks of long-term anti-inflammatory use, advised to try and start taking this daily he agreed.  Advised to fu with pcp possibly for further imaging possibly US for further guidance  cbg 77    Discussed results/diagnosis/plan with patient in clinic. Strict precautions given to patient to monitor for worsening signs and symptoms. Advised to follow up with PCP or specialist.    Explained side effects of medications prescribed with patient and informed him/her to discontinue use if he/she has any side effects and to inform UC or PCP if this occurs. All questions answered. Strict ED verses clinic return precautions stressed and given in depth.  Advised if symptoms worsens of fail to improve he/she should go to the Emergency Room. Discharge and follow-up instructions given verbally/printed with the patient who expressed understanding and willingness to comply with my recommendations. Patient voiced understanding and in agreement with current treatment plan. Patient exits the exam room in no acute distress. Conversant and engaged during discharge discussion, verbalized understanding.         The patient was advised that NSAID-type medications have two very important potential side effects: gastrointestinal irritation including hemorrhage and renal injuries. Pt was asked to take the medication with food and to stop if he/she experiences any GI upset. I asked pt to call for vomiting, abdominal pain or black/bloody stools. The patient expresses understanding of these issues and questions were answered.  Pt educated on common NSAIDS, and instructed not to mix them, and to follow instructions on the label for the formulations available over the counter. Pt informed that common NSAIDs include: regular aspirin, Aleve, Advil, Excedrin, ibuprofen, Motrin, Goody's or, BC powders, meloxicam, naproxen, diclofenac, Celebrex. Patient was counseled that the chronic use of anti-inflammatory medication can increase the risk of GI bleed and cardiovascular events as well as cause kidney damage and increase blood pressure.     Flank pain  -     POCT Urinalysis, Dipstick, Automated, W/O Scope  -     ketorolac injection 15 mg  -     LIDOcaine (LIDODERM) 5 %; Place 1 patch onto the skin once daily. Remove & Discard patch within 12 hours or as directed by MD for 7 days  Dispense: 7 patch; Refill: 0  -     CULTURE, URINE  -     diclofenac sodium (VOLTAREN) 1 % Gel; Apply 2 g topically 4 (four) times daily.  Dispense: 20 g; Refill: 0    Glucosuria  -     POCT Glucose, Hand-Held Device                 Patient Instructions   General Discharge Instructions   PLEASE READ YOUR DISCHARGE  INSTRUCTIONS ENTIRELY AS IT CONTAINS IMPORTANT INFORMATION.  If you were prescribed a narcotic or controlled medication, do not drive or operate heavy equipment or machinery while taking these medications.  If you were prescribed antibiotics, please take them to completion.  You must understand that you've received an Urgent Care treatment only and that you may be released before all your medical problems are known or treated. You, the patient, will arrange for follow up care as instructed.    OVER THE COUNTER RECOMMENDATIONS/SUGGESTIONS.    Make sure to stay well hydrated.    Use Nasal Saline to mechanically move any post nasal drip from your eustachian tube or from the back of your throat.    Use warm salt water gargles to ease your throat pain. Warm salt water gargles as needed for sore throat- 1/2 tsp salt to 1 cup warm water, gargle as desired.    Use an antihistamine such as Claritin, Zyrtec or Allegra to dry you out.    Use pseudoephedrine (behind the counter) to decongest. Pseudoephedrine 30 mg up to 240 mg /day. It can raise your blood pressure and give you palpitations.    Use mucinex (guaifenesin) to break up mucous up to 2400mg/day to loosen any mucous.    The mucinex DM pill has a cough suppressant that can be sedating. It can be used at night to stop the tickle at the back of your throat.    You can use Mucinex D (it has guaifenesin and a high dose of pseudoephedrine) in the mornings to help decongest.    Use Afrin in each nare for no longer than 3 days, as it is addictive. It can also dry out your mucous membranes and cause elevated blood pressure. This is especially useful if you are flying.    Use Flonase 1-2 sprays/nostril per day. It is a local acting steroid nasal spray, if you develop a bloody nose, stop using the medication immediately.    Sometimes Nyquil at night is beneficial to help you get some rest, however it is sedating and it does have an antihistamine, and tylenol.    Honey is a  natural cough suppressant that can be used.    Tylenol up to 4,000 mg a day is safe for short periods and can be used for body aches, pain, and fever. However in high doses and prolonged use it can cause liver irritation.    Ibuprofen is a non-steroidal anti-inflammatory that can be used for body aches, pain, and fever.However it can also cause stomach irritation if over used.     Follow up with your PCP or specialty clinic as instructed in the next 2-3 days if not improved or as needed. You can call [phone] to schedule an appointment with appropriate provider.      If you condition worsens, we recommend that you receive another evaluation at the emergency room immediately or contact your primary medical clinic's after hours call service to discuss your concerns.      Please return here or go to the Emergency Department for any concerns or worsening condition.   You can also call [phone] to schedule an appointment with the appropriate provider.    Please return here or go to the Emergency Department for any concerns or worsening of condition.    Thank you for choosing [facility]!    Our goal in the Urgent Care is to always provide outstanding medical care. You may receive a survey by mail or e-mail in the next week regarding your experience today. We would greatly appreciate you completing and returning the survey. Your feedback provides us with a way to recognize our staff who provide very good care, and it helps us learn how to improve when your experience was below our aspiration of excellence.      We appreciate you trusting us with your medical care. We hope you feel better soon. We will be happy to take care of you for all of your future medical needs.    Sincerely,    CASSY Craig  You received an injection of a powerful NSAID today (Toradol).  It's effects will last up to 24 hours. Please do not take another NSAID (ie aspirin, ibuprofen, Aleve, Advil or Motrin) until this time  tomorrow.  If you continue to have pain, you may take Tylenol (acetaminophen) if you are not allergic to this medication.

## 2022-08-05 NOTE — PATIENT INSTRUCTIONS
General Discharge Instructions   PLEASE READ YOUR DISCHARGE INSTRUCTIONS ENTIRELY AS IT CONTAINS IMPORTANT INFORMATION.  If you were prescribed a narcotic or controlled medication, do not drive or operate heavy equipment or machinery while taking these medications.  If you were prescribed antibiotics, please take them to completion.  You must understand that you've received an Urgent Care treatment only and that you may be released before all your medical problems are known or treated. You, the patient, will arrange for follow up care as instructed.    OVER THE COUNTER RECOMMENDATIONS/SUGGESTIONS.    Make sure to stay well hydrated.    Use Nasal Saline to mechanically move any post nasal drip from your eustachian tube or from the back of your throat.    Use warm salt water gargles to ease your throat pain. Warm salt water gargles as needed for sore throat- 1/2 tsp salt to 1 cup warm water, gargle as desired.    Use an antihistamine such as Claritin, Zyrtec or Allegra to dry you out.    Use pseudoephedrine (behind the counter) to decongest. Pseudoephedrine 30 mg up to 240 mg /day. It can raise your blood pressure and give you palpitations.    Use mucinex (guaifenesin) to break up mucous up to 2400mg/day to loosen any mucous.    The mucinex DM pill has a cough suppressant that can be sedating. It can be used at night to stop the tickle at the back of your throat.    You can use Mucinex D (it has guaifenesin and a high dose of pseudoephedrine) in the mornings to help decongest.    Use Afrin in each nare for no longer than 3 days, as it is addictive. It can also dry out your mucous membranes and cause elevated blood pressure. This is especially useful if you are flying.    Use Flonase 1-2 sprays/nostril per day. It is a local acting steroid nasal spray, if you develop a bloody nose, stop using the medication immediately.    Sometimes Nyquil at night is beneficial to help you get some rest, however it is sedating and it  does have an antihistamine, and tylenol.    Honey is a natural cough suppressant that can be used.    Tylenol up to 4,000 mg a day is safe for short periods and can be used for body aches, pain, and fever. However in high doses and prolonged use it can cause liver irritation.    Ibuprofen is a non-steroidal anti-inflammatory that can be used for body aches, pain, and fever.However it can also cause stomach irritation if over used.     Follow up with your PCP or specialty clinic as instructed in the next 2-3 days if not improved or as needed. You can call [phone] to schedule an appointment with appropriate provider.      If you condition worsens, we recommend that you receive another evaluation at the emergency room immediately or contact your primary medical clinic's after hours call service to discuss your concerns.      Please return here or go to the Emergency Department for any concerns or worsening condition.   You can also call [phone] to schedule an appointment with the appropriate provider.    Please return here or go to the Emergency Department for any concerns or worsening of condition.    Thank you for choosing [facility] [place]!    Our goal in the Urgent Care is to always provide outstanding medical care. You may receive a survey by mail or e-mail in the next week regarding your experience today. We would greatly appreciate you completing and returning the survey. Your feedback provides us with a way to recognize our staff who provide very good care, and it helps us learn how to improve when your experience was below our aspiration of excellence.      We appreciate you trusting us with your medical care. We hope you feel better soon. We will be happy to take care of you for all of your future medical needs.    Sincerely,    CASSY Craig  You received an injection of a powerful NSAID today (Toradol).  It's effects will last up to 24 hours. Please do not take another NSAID (ie  aspirin, ibuprofen, Aleve, Advil or Motrin) until this time tomorrow.  If you continue to have pain, you may take Tylenol (acetaminophen) if you are not allergic to this medication.

## 2022-08-07 LAB — BACTERIA UR CULT: NO GROWTH

## 2022-08-08 ENCOUNTER — CLINICAL SUPPORT (OUTPATIENT)
Dept: PAIN MEDICINE | Facility: CLINIC | Age: 62
End: 2022-08-08
Payer: COMMERCIAL

## 2022-08-08 DIAGNOSIS — M79.10 MYALGIA: Primary | ICD-10-CM

## 2022-08-08 DIAGNOSIS — M54.12 CERVICAL RADICULOPATHY: ICD-10-CM

## 2022-08-08 DIAGNOSIS — M62.838 MUSCLE SPASM: ICD-10-CM

## 2022-08-08 DIAGNOSIS — R29.898 WEAKNESS OF LEFT UPPER EXTREMITY: ICD-10-CM

## 2022-08-08 DIAGNOSIS — M96.1 POST LAMINECTOMY SYNDROME: ICD-10-CM

## 2022-08-08 DIAGNOSIS — G24.3 CERVICAL DYSTONIA: ICD-10-CM

## 2022-08-08 PROCEDURE — 99999 PR PBB SHADOW E&M-EST. PATIENT-LVL I: CPT | Mod: PBBFAC,,, | Performed by: STUDENT IN AN ORGANIZED HEALTH CARE EDUCATION/TRAINING PROGRAM

## 2022-08-08 PROCEDURE — 99999 PR PBB SHADOW E&M-EST. PATIENT-LVL I: ICD-10-PCS | Mod: PBBFAC,,, | Performed by: STUDENT IN AN ORGANIZED HEALTH CARE EDUCATION/TRAINING PROGRAM

## 2022-08-08 PROCEDURE — 99214 OFFICE O/P EST MOD 30 MIN: CPT | Mod: 25,S$GLB,, | Performed by: STUDENT IN AN ORGANIZED HEALTH CARE EDUCATION/TRAINING PROGRAM

## 2022-08-08 PROCEDURE — 99214 PR OFFICE/OUTPT VISIT, EST, LEVL IV, 30-39 MIN: ICD-10-PCS | Mod: 25,S$GLB,, | Performed by: STUDENT IN AN ORGANIZED HEALTH CARE EDUCATION/TRAINING PROGRAM

## 2022-08-08 PROCEDURE — 76942 ECHO GUIDE FOR BIOPSY: CPT | Mod: S$GLB,,, | Performed by: STUDENT IN AN ORGANIZED HEALTH CARE EDUCATION/TRAINING PROGRAM

## 2022-08-08 PROCEDURE — 20553 PR INJECT TRIGGER POINTS, > 3: ICD-10-PCS | Mod: S$GLB,,, | Performed by: STUDENT IN AN ORGANIZED HEALTH CARE EDUCATION/TRAINING PROGRAM

## 2022-08-08 PROCEDURE — 20553 NJX 1/MLT TRIGGER POINTS 3/>: CPT | Mod: S$GLB,,, | Performed by: STUDENT IN AN ORGANIZED HEALTH CARE EDUCATION/TRAINING PROGRAM

## 2022-08-08 PROCEDURE — 76942 PR U/S GUIDANCE FOR NEEDLE GUIDANCE: ICD-10-PCS | Mod: S$GLB,,, | Performed by: STUDENT IN AN ORGANIZED HEALTH CARE EDUCATION/TRAINING PROGRAM

## 2022-08-08 RX ORDER — HYDROCODONE BITARTRATE AND ACETAMINOPHEN 5; 325 MG/1; MG/1
1 TABLET ORAL
Qty: 21 TABLET | Refills: 0 | Status: SHIPPED | OUTPATIENT
Start: 2022-08-08 | End: 2022-09-07

## 2022-08-08 RX ORDER — TRIAMCINOLONE ACETONIDE 40 MG/ML
40 INJECTION, SUSPENSION INTRA-ARTICULAR; INTRAMUSCULAR
Status: COMPLETED | OUTPATIENT
Start: 2022-08-08 | End: 2022-08-08

## 2022-08-08 RX ORDER — BACLOFEN 10 MG/1
10 TABLET ORAL 3 TIMES DAILY PRN
Qty: 90 TABLET | Refills: 1 | Status: SHIPPED | OUTPATIENT
Start: 2022-08-08 | End: 2022-10-07

## 2022-08-08 RX ADMIN — TRIAMCINOLONE ACETONIDE 40 MG: 40 INJECTION, SUSPENSION INTRA-ARTICULAR; INTRAMUSCULAR at 05:08

## 2022-08-08 NOTE — PROGRESS NOTES
Chronic Pain - f/u    Referring Physician: No ref. provider found    Date: 08/08/2022     Re: Turner Silva  MR#: [id number]  YOB: 1960  Age: 62 y.o.    Chief Complaint: neck pain  No chief complaint on file.    **This note is dictated using the M*Modal Fluency Direct word recognition program. There are word recognition mistakes that are occasionally missed on review.**    ASSESSMENT: 62 y.o. year old male with neck and shoulder/arm pain, consistent with     1. Myalgia  triamcinolone acetonide injection 40 mg   2. Cervical radiculopathy  baclofen (LIORESAL) 10 MG tablet    HYDROcodone-acetaminophen (NORCO) 5-325 mg per tablet   3. Muscle spasm  baclofen (LIORESAL) 10 MG tablet    HYDROcodone-acetaminophen (NORCO) 5-325 mg per tablet   4. Post laminectomy syndrome     5. Weakness of left upper extremity     6. Cervical dystonia           PLAN:     Cervical radiculopathy / DDD  -patient has had 4 neck surgeries including recent C1-2 PSF and C5-7 ACDF.  Has left UE weakness, and symptoms of radiculopathy and DDD.  -discused trying cervical epidural injection.  Due to the presence of the hardware, it may be difficult to get an appropriate view and there is a risk that the procedure will have to be aborted due to this.  The patient is aware.  -s/p JB on 7/15/22 - improved arm pain @2w, but neck/shoulder pain remains.  - neurosurgery was not concerned about steroid interferting with the healing of the bone graft  -discussed SCS and treatment option for pain, but that it will not help his dexterity.  -Short course of Norco 5mg #21 tabs for severe pain. Patient aware that this is just for breakthrough, severe pain and should not be taken every day. Mildly helpful. Refill today. If he needs another script then we will have him sign a pain contract.    Myalgia with radiation into the shoulders  -discussed series of TPI.  He would like to try this. Risks, benefits, and alternatives were discussed with the  patient, and He would like to proceed.  -TPI#1 on 7/26 - some improvement in shoulder but not in the neck.  -TPI #2 on 8/8 - s/p today  TPI #3 on 8/22    Cervical dystonia  -trial baclofen 10mg TID PRN. New script since it did not go thru next time.  -discussed botox. Defer for now    - RTC 2 weeks  - Counseled patient regarding the importance of  activity modification.    The above plan and management options were discussed at length with patient. Patient is in agreement with the above and verbalized understanding. It will be communicated with the referring physician via electronic record, fax, or mail.  Lab/study reports reviewed were important and necessary because subsequent medical and treatment recommendations required review of the above lab/study reports. Images viewed/reviewed above were important and necessary because subsequent medical and treatment recommendations required review of the reviewed image(s).     Electronically signed by:  Tj Nayak DO  08/08/2022    =========================================================================================================    SUBJECTIVE:      Interval History 7/25/2022:     Turner Silva is a 62 y.o. male presents to the clinic for follow up.  Since last visit the pain has has improved in some ways and worsened in others.  The arm pain seems to have improved since the injection, but still having the neck pain and the trap pain.  He is still having issues with dexterity in the left hand and subjective weakness.    Hydrocodone didn't make a significant difference.    The pain is located in the neck area and radiates to the left shoulder(blade).  The pain is described as aching, burning and throbbing    At BEST  4/10   At WORST  8/10 on the WORST day.    On average pain is rated as 5/10.   Today the pain is rated as 4/10  Symptoms interfere with daily activity, sleeping and work.   Exacerbating factors: Night Time.    Mitigating factors nothing  and medications.     Current pain medications: tylenol, Norco 5mg (rare)  Failed Pain Medications: tizanidine, meloxicam, tramadol, hydrocodone    Pain Procedures:  7/15/22 - JB C7-T1 - 50% improvement in the left arm, but only 0-25% in the neck/shoulder.  7/26-22 - TPI left cervical, trap, levator, rhomboid, scalene    Initial hx:  Turner Silva is a 62 y.o. male presents to the clinic for the evaluation of neck pain. The pain started 20 years ago following no inciting event and symptoms have been worsening.  States that he has good and bad days.  Pain is in the neck and down into the left scapular and shoulder.     Pain Description:    The pain is located in the neck area and radiates to the left shoulder(blade)/ down the arm .    At BEST  6/10   At WORST  10/10 on the WORST day.    On average pain is rated as 5/10.   Today the pain is rated as 7/10  The pain is continuous.  The pain is described as aching, burning and throbbing    Symptoms interfere with daily activity and sleeping.   Exacerbating factors: Lifting and moving towards the left side.    Mitigating factors nothing and heat.   He reports 8 hours of sleep per night.    Physical Therapy/Home Exercise: Yes, currently in Physical therapy    Current Pain Medications:    - tylenol    Failed Pain Medications:    - tizanidine, meloxicam, tramadol, hydrocodone, soma    Pain Treatment Therapies:    Pain procedures:   Epidural LBP > 1 year ago.  Physical Therapy: has home exercise program and compelted PT in 06/2022  Chiropractor: none  Acupuncture: none  TENS unit: none  Spinal decompression:   4 cervical spine surgeries  2 lumbar spine surgeries  Joint replacement: left shoulder and left hip replacement    Patient denies urinary incontinence, bowel incontinence, significant motor weakness and loss of sensations.  Patient denies any suicidal or homicidal ideations     report:  Reviewed and consistent with medication use as prescribed.    Imaging:    MRI Cervical 2022:  FINDINGS:  Postoperative changes of C1-C2 posterior fusion with bilateral pedicle screws.  Minimal anterolisthesis of C7 on T1 similar to CT examination 03/10/2022     Additional postoperative changes of anterior fusion of C5 through C7 with partial osseous fusion of the C5 through C7 vertebral bodies.  There is straightening of the normal cervical lordosis.  No acute fracture or marrow placement process.     Cord: Normal contour and caliber.  No cord signal abnormality.     Skull base and craniocervical junction: Significant soft tissue surrounding the odontoid, resulting in mild narrowing of the craniocervical junction, similar to prior.     Degenerative findings:     C2-C3: Facet arthropathy and posterior disc osteophyte complex without significant spinal canal stenosis or neural foraminal narrowing.     C3-C4: Bilateral facet arthropathy and uncovertebral spurring results in severe left and mild right neural foraminal narrowing.  No significant spinal canal stenosis.     C4-C5: Bilateral facet arthropathy and uncovertebral spurring results in severe left and moderate right neural foraminal narrowing.  Posterior disc osteophyte complex results in mild spinal canal stenosis.     C5-C6: Facet arthropathy and uncovertebral spurring results in mild bilateral neural foraminal narrowing.  No significant canal stenosis.     C6-C7: Facet arthropathy and uncovertebral spurring results in moderate left and mild right neural foraminal narrowing.  No significant spinal canal stenosis.     C7-T1: Facet arthropathy particularly left sided, uncovertebral spurring, and posterior disc osteophyte complex results in moderate bilateral neural foraminal narrowing and mild spinal canal stenosis.     Paraspinal muscles & soft tissues: Unremarkable.     Impression:     Stable postoperative changes of C1-C2 posterior fusion and anterior fusion of C5 through C7.     Mild degenerative change with severe left neural  foraminal narrowing at C3-C4 and C4-C5 with additional multilevel neural foraminal narrowing.  No significant canal stenosis.     Similar appearance of soft tissue pannus posterior to the odontoid resulting in at most mild narrowing at the craniocervical junction.    Past Medical History:   Diagnosis Date    Gout     Hyperlipidemia     Hypertension     Myocardial infarction      Past Surgical History:   Procedure Laterality Date    CARPAL TUNNEL RELEASE Bilateral     cervical fusion      X 3    EPIDURAL STEROID INJECTION N/A 7/15/2022    Procedure: JUAN cervical;  Surgeon: Tj Nayak DO;  Location: [facility] OR;  Service: Pain Management;  Laterality: N/A;    FUSION OF SPINE WITH INSTRUMENTATION N/A 2021    Procedure: Posterior C1-2 Fusion;  Surgeon: Isidro Aguilar MD;  Location: [facility] OR;  Service: Neurosurgery;  Laterality: N/A;  Anesthesia; General  Blood: Type and Screen  NM: EMG, SEP, MEP  Rad: C-Arm  Brace: [place] CYNDI  Bed: Reg slider w/ gel rolls  Bed Position: head to anesthesia  headrest: [place]  Misc: jayson Pruitt  Vendor: [employer]    LAMINECTOMY      ROTATOR CUFF REPAIR Left     X 2    SHOULDER SURGERY Left     TOTAL HIP ARTHROPLASTY Left 2018     Social History     Socioeconomic History    Marital status:    Tobacco Use    Smoking status: Former Smoker     Packs/day: 1.00     Years: 10.00     Pack years: 10.00     Quit date: 10/1/1991     Years since quittin.8    Smokeless tobacco: Never Used   Substance and Sexual Activity    Alcohol use: Yes     Comment: Occasionally    Drug use: No     Family History   Problem Relation Age of Onset    Rheum arthritis Mother     Heart disease Father     Gout Father     Rheum arthritis Sister        Review of patient's allergies indicates:  No Known Allergies    Current Outpatient Medications   Medication Sig    acetaminophen (TYLENOL) 500 MG tablet Take 500 mg by mouth as needed for Pain.    alirocumab (PRALUENT PEN) 75  mg/mL PnIj Inject 75 mg into the skin every 14 (fourteen) days.    allopurinol (ZYLOPRIM) 300 MG tablet Take 300 mg by mouth once daily.    aspirin 81 MG Chew Take 81 mg by mouth.    baclofen (LIORESAL) 10 MG tablet Take 1 tablet (10 mg total) by mouth 3 (three) times daily as needed (pain, spasms).    buPROPion (WELLBUTRIN XL) 300 MG 24 hr tablet Take 300 mg by mouth once daily.    cholecalciferol, vitamin D3, (VITAMIN D3) 25 mcg (1,000 unit) capsule Take 1,000 Units by mouth once daily.    colchicine 0.6 mg tablet Take 1 tablet (0.6 mg total) by mouth once daily.    diazePAM (VALIUM) 5 MG tablet Take 1 tablet (5 mg total) by mouth every 6 (six) hours as needed (muscle spasm).    diclofenac sodium (VOLTAREN) 1 % Gel Apply 2 g topically 4 (four) times daily.    duloxetine (CYMBALTA) 60 MG capsule     gabapentin (NEURONTIN) 300 MG capsule 300 mg.     HYDROcodone-acetaminophen (NORCO) 5-325 mg per tablet Take 1 tablet by mouth every 24 hours as needed for Pain.    LIDOcaine (LIDODERM) 5 % Place 1 patch onto the skin once daily. Remove & Discard patch within 12 hours or as directed by MD for 7 days    lisinopriL 10 MG tablet Take 10 mg by mouth.    meloxicam (MOBIC) 15 MG tablet Take 15 mg by mouth once daily.    metFORMIN (GLUCOPHAGE-XR) 500 MG ER 24hr tablet Take 500 mg by mouth 2 (two) times daily with meals.    metoprolol succinate (TOPROL-XL) 100 MG 24 hr tablet     multivitamin capsule Take 1 capsule by mouth once daily.    omega-3 acid ethyl esters (LOVAZA) 1 gram capsule TAKE 2 PILLS TWICE DAILY    pulse oximeter (PULSE OXIMETER) device by Apply Externally route 2 (two) times a day. Use twice daily at 8 AM and 3 PM and record the value in [facility] as directed.    rosuvastatin (CRESTOR) 20 MG tablet Take 20 mg by mouth once daily.    tadalafiL (CIALIS) 5 MG tablet Take 5 mg by mouth once daily.     Current Facility-Administered Medications   Medication    acetaminophen tablet 650 mg     albuterol inhaler 2 puff     Facility-Administered Medications Ordered in Other Visits   Medication    0.9%  NaCl infusion       REVIEW OF SYSTEMS:    GENERAL:  No weight loss, malaise or fevers.  HEENT:   No recent changes in vision or hearing  NECK:  Negative for lumps, no difficulty with swallowing.  RESPIRATORY:  Negative for cough, wheezing or shortness of breath, patient denies any recent URI.  CARDIOVASCULAR:  Negative for chest pain, leg swelling or palpitations.  GI:  Negative for abdominal discomfort, blood in stools or black stools or change in bowel habits.  MUSCULOSKELETAL:  See HPI.  SKIN:  Negative for lesions, rash, and itching.  PSYCH:  No mood disorder or recent psychosocial stressors.  Patients sleep is not disturbed secondary to pain.  HEMATOLOGY/LYMPHOLOGY:  Negative for prolonged bleeding, bruising easily or swollen nodes.  Patient is not currently taking any anti-coagulants + bruising easily   NEURO:   No history of headaches, syncope, paralysis, seizures or tremors. + headaches & tremors  All other reviewed and negative other than HPI.     OBJECTIVE:    There were no vitals taken for this visit.    PHYSICAL EXAMINATION:    GENERAL: Well appearing, in no acute distress, alert and oriented x3.  PSYCH:  Mood and affect appropriate.  SKIN: Skin color, texture, turgor normal, no rashes or lesions.  HEAD/FACE:  Normocephalic, atraumatic. Cranial nerves grossly intact.    NECK:   - Positive pain to palpation over the cervical paraspinous muscles. With palpable trigger points  - Spurling  Positive.  - Positive pain with neck flexion, extension, or lateral flexion.   - severely limited ROM of the neck    CV: RRR with palpation of the radial artery.  PULM: CTAB. No evidence of respiratory difficulty, symmetric chest rise.  GI:  Soft and non-tender.    MUSCULOSKELETAL:  No atrophy or tone abnormalities are noted in the UE or LE.  No deformities, edema, or skin discoloration are noted on visible skin.  Good capillary refill.    NEURO: Bilateral upper and lower extremity coordination and muscle stretch reflexes are physiologic and symmetric.      NEUROLOGICAL EXAM:  MENTAL STATUS: A x O x 3, good concentration, speech is fluent and goal directed  MEMORY: recent and remote are intact  CN: CN2-12 grossly intact  MOTOR: 5/5 in all muscle groups of the RUE but 4/5 in all muscles of the LUE  DTRs: 2+ intact symmetric  Sensation:    -no Loss of sensation in a left upper and right upper C-4, C-5, C-6, C-7 and C-8 bilaterally distribution.  [place]: absent on the bilateral side(s)  Clonus: absent on the bilateral side(s)

## 2022-08-08 NOTE — PROCEDURES
"Procedures   PROCEDURE: Trigger Point Injections with Ultrasound Guidance- Location of Myofascial Pain - bilateral Suboccipital Muscles, CERVICAL Paraspinals (Splenius Capitis, Semispinalis Capitis, Longissimus Capitis), Trapezius, Levator Scapulae, Rhomboid Minor and Rhomboid Major    PATIENT NAME: Turner Silva   MRN: [id number]     DATE OF PROCEDURE: 08/08/2022    Diagnosis: Myalgia (M79.1)  CPT code: 97367 (Injection(s); single or multiple trigger point(s), 3 or more muscles), 88082 (Ultrasonic guidance for needle placement imaging supervision and interpretation)    Injection # 2 this year.    Postprocedural Diagnosis: Same  Needle Type: - 27G 1.5" needle    Solution injected: A 10ml mixture of 0.25% Bupivacaine and 40mg Kenalog  Volume Injected for each Trigger Point: 2ml  Number of Trigger Point Injected: 6    Estimated Blood Loss - <2ml  Drains: None  Specimens Removed: None  Urine Output - Not Measured  Complications: None  Outcome: Good  VAS Before Injection:  5/10  VAS After Injection:  5/10    Informed Consent:  The patient's condition and proposed procedures, risks (including complications of nerve damage,  bleeding, infection, and failure of pain relief), and alternatives were discussed with the patient or responsible party.  The patient's/responsible party's questions were answered.  The patient/responsible party appeared to understand and chose to proceed.  Informed consent was obtained.    Procedure in Detail:  The procedure was performed in the procedure treatment room.  After obtaining written consent, the patient was placed in a seated position. By ultrasound the target muscles were identified that correlated with the patient's pain pattern.    Procedural Pause:  A procedural pause verifying correct patient, medical record number, allergies, medications to be administered, current vital signs, and surgical site was performed immediately prior to beginning the procedure.    The above noted needle " was advanced towards each trigger point under ultrasound guidance until the patient's typical radiating pain pattern was reproduced. Muscle twitch or reproduction of pain confirmed proper placement of the needle. After negative aspiration for heme, the above noted solution was injected in a fanned-out distribution at each trigger point. The needle(s) was then removed.     All sites were done in the same manner. The patient tolerated the procedure well and without complications. After meeting discharge criteria, the patient was discharged home safely.    DISCUSSION:  Trigger point injections were performed today to treat the patients myofascial pain. The purpose was to improve the patients function and decrease pain. We have reminded the patient that the trigger point injections must be done in conjunction with a stretching program.  Without a stretching program, results from trigger point injections are often suboptimal. The patient was advised to relax and avoid any heavy lifting or excessive bending for 24 hours. He was advised that he may return to his usual activities after 24 hours if he is otherwise feeling well.  The patient was advised not to bathe or soak in water for 24 hours but that showering would be acceptable.      Note Electronically Signed By:  Tj Weir  08/08/2022

## 2022-08-22 ENCOUNTER — TELEPHONE (OUTPATIENT)
Dept: PAIN MEDICINE | Facility: CLINIC | Age: 62
End: 2022-08-22
Payer: MEDICARE

## 2022-08-22 ENCOUNTER — CLINICAL SUPPORT (OUTPATIENT)
Dept: PAIN MEDICINE | Facility: CLINIC | Age: 62
End: 2022-08-22
Payer: MEDICARE

## 2022-08-22 DIAGNOSIS — M79.10 MYALGIA: Primary | ICD-10-CM

## 2022-08-22 DIAGNOSIS — M62.838 MUSCLE SPASM: ICD-10-CM

## 2022-08-22 PROCEDURE — 76942 ECHO GUIDE FOR BIOPSY: CPT | Mod: 26,S$PBB,, | Performed by: STUDENT IN AN ORGANIZED HEALTH CARE EDUCATION/TRAINING PROGRAM

## 2022-08-22 PROCEDURE — 20553 NJX 1/MLT TRIGGER POINTS 3/>: CPT | Mod: PBBFAC | Performed by: STUDENT IN AN ORGANIZED HEALTH CARE EDUCATION/TRAINING PROGRAM

## 2022-08-22 PROCEDURE — 99999 PR PBB SHADOW E&M-EST. PATIENT-LVL I: ICD-10-PCS | Mod: PBBFAC,,, | Performed by: STUDENT IN AN ORGANIZED HEALTH CARE EDUCATION/TRAINING PROGRAM

## 2022-08-22 PROCEDURE — 99499 NO LOS: ICD-10-PCS | Mod: S$PBB,,, | Performed by: STUDENT IN AN ORGANIZED HEALTH CARE EDUCATION/TRAINING PROGRAM

## 2022-08-22 PROCEDURE — 76942 ECHO GUIDE FOR BIOPSY: CPT | Mod: PBBFAC | Performed by: STUDENT IN AN ORGANIZED HEALTH CARE EDUCATION/TRAINING PROGRAM

## 2022-08-22 PROCEDURE — 99999 PR PBB SHADOW E&M-EST. PATIENT-LVL I: CPT | Mod: PBBFAC,,, | Performed by: STUDENT IN AN ORGANIZED HEALTH CARE EDUCATION/TRAINING PROGRAM

## 2022-08-22 PROCEDURE — 20553 PR INJECT TRIGGER POINTS, > 3: ICD-10-PCS | Mod: S$PBB,,, | Performed by: STUDENT IN AN ORGANIZED HEALTH CARE EDUCATION/TRAINING PROGRAM

## 2022-08-22 PROCEDURE — 76942 PR U/S GUIDANCE FOR NEEDLE GUIDANCE: ICD-10-PCS | Mod: 26,S$PBB,, | Performed by: STUDENT IN AN ORGANIZED HEALTH CARE EDUCATION/TRAINING PROGRAM

## 2022-08-22 PROCEDURE — 20553 NJX 1/MLT TRIGGER POINTS 3/>: CPT | Mod: S$PBB,,, | Performed by: STUDENT IN AN ORGANIZED HEALTH CARE EDUCATION/TRAINING PROGRAM

## 2022-08-22 PROCEDURE — 99499 UNLISTED E&M SERVICE: CPT | Mod: S$PBB,,, | Performed by: STUDENT IN AN ORGANIZED HEALTH CARE EDUCATION/TRAINING PROGRAM

## 2022-08-22 RX ORDER — TRIAMCINOLONE ACETONIDE 40 MG/ML
20 INJECTION, SUSPENSION INTRA-ARTICULAR; INTRAMUSCULAR
Status: COMPLETED | OUTPATIENT
Start: 2022-08-22 | End: 2022-08-22

## 2022-08-22 RX ADMIN — TRIAMCINOLONE ACETONIDE 20 MG: 40 INJECTION, SUSPENSION INTRA-ARTICULAR; INTRAMUSCULAR at 05:08

## 2022-08-22 NOTE — PROCEDURES
"Procedures   PROCEDURE: Trigger Point Injections with Ultrasound Guidance- Location of Myofascial Pain - left CERVICAL Paraspinals (Splenius Capitis, Semispinalis Capitis, Longissimus Capitis), Trapezius, Levator Scapulae, Rhomboid Minor and Rhomboid Major    PATIENT NAME: Turner Silva   MRN: [id number]     DATE OF PROCEDURE: 08/22/2022    Diagnosis: Myalgia (M79.1)  CPT code: 18189 (Injection(s); single or multiple trigger point(s), 3 or more muscles), 09708 (Ultrasonic guidance for needle placement imaging supervision and interpretation)    Injection # 3 this year.    Postprocedural Diagnosis: Same  Needle Type: - 27G 1.5" needle    Solution injected: A 10ml mixture of 1% Lidocaine, 0.25% Bupivacaine and 20mg Kenalog  Volume Injected for each Trigger Point: 3ml  Number of Trigger Point Injected: 4    Estimated Blood Loss - <2ml  Drains: None  Specimens Removed: None  Urine Output - Not Measured  Complications: None  Outcome: Good  VAS Before Injection:  5/10  VAS After Injection:  2/10    Informed Consent:  The patient's condition and proposed procedures, risks (including complications of nerve damage,  bleeding, infection, and failure of pain relief), and alternatives were discussed with the patient or responsible party.  The patient's/responsible party's questions were answered.  The patient/responsible party appeared to understand and chose to proceed.  Informed consent was obtained.    Procedure in Detail:  The procedure was performed in the procedure treatment room.  After obtaining written consent, the patient was placed in a prone position. By ultrasound the target muscles were identified that correlated with the patient's pain pattern.    Procedural Pause:  A procedural pause verifying correct patient, medical record number, allergies, medications to be administered, current vital signs, and surgical site was performed immediately prior to beginning the procedure.    The above noted needle was advanced " towards each trigger point under ultrasound guidance until the patient's typical radiating pain pattern was reproduced. Muscle twitch or reproduction of pain confirmed proper placement of the needle. After negative aspiration for heme, the above noted solution was injected in a fanned-out distribution at each trigger point. The needle(s) was then removed.     All sites were done in the same manner. The patient tolerated the procedure well and without complications. After meeting discharge criteria, the patient was discharged home safely.    DISCUSSION:  Trigger point injections were performed today to treat the patients myofascial pain. The purpose was to improve the patients function and decrease pain. We have reminded the patient that the trigger point injections must be done in conjunction with a stretching program.  Without a stretching program, results from trigger point injections are often suboptimal. The patient was advised to relax and avoid any heavy lifting or excessive bending for 24 hours. He was advised that he may return to his usual activities after 24 hours if he is otherwise feeling well.  The patient was advised not to bathe or soak in water for 24 hours but that showering would be acceptable.      Note Electronically Signed By:  Tj Weir  08/22/2022

## 2022-08-22 NOTE — PROGRESS NOTES
HPI  Patient presenting for trigger point injection     Patient on Anti-coagulation No    No health changes since previous encounter    Past Medical History:   Diagnosis Date    Gout     Hyperlipidemia     Hypertension     Myocardial infarction      Past Surgical History:   Procedure Laterality Date    CARPAL TUNNEL RELEASE Bilateral     cervical fusion      X 3    EPIDURAL STEROID INJECTION N/A 7/15/2022    Procedure: JUAN cervical;  Surgeon: Tj Nayak DO;  Location: [facility] OR;  Service: Pain Management;  Laterality: N/A;    FUSION OF SPINE WITH INSTRUMENTATION N/A 8/2/2021    Procedure: Posterior C1-2 Fusion;  Surgeon: Isidro Aguilar MD;  Location: [facility] OR;  Service: Neurosurgery;  Laterality: N/A;  Anesthesia; General  Blood: Type and Screen  NM: EMG, SEP, MEP  Rad: C-Arm  Brace: [place] J  Bed: Reg slider w/ gel rolls  Bed Position: head to anesthesia  headrest: [place]  Misc: jayson Pruitt  Vendor: [employer]    LAMINECTOMY      ROTATOR CUFF REPAIR Left     X 2    SHOULDER SURGERY Left     TOTAL HIP ARTHROPLASTY Left 2018     Review of patient's allergies indicates:  No Known Allergies   Current Outpatient Medications   Medication Sig    acetaminophen (TYLENOL) 500 MG tablet Take 500 mg by mouth as needed for Pain.    alirocumab (PRALUENT PEN) 75 mg/mL PnIj Inject 75 mg into the skin every 14 (fourteen) days.    allopurinol (ZYLOPRIM) 300 MG tablet Take 300 mg by mouth once daily.    aspirin 81 MG Chew Take 81 mg by mouth.    baclofen (LIORESAL) 10 MG tablet Take 1 tablet (10 mg total) by mouth 3 (three) times daily as needed (pain, spasms).    buPROPion (WELLBUTRIN XL) 300 MG 24 hr tablet Take 300 mg by mouth once daily.    cholecalciferol, vitamin D3, (VITAMIN D3) 25 mcg (1,000 unit) capsule Take 1,000 Units by mouth once daily.    colchicine 0.6 mg tablet Take 1 tablet (0.6 mg total) by mouth once daily.    diazePAM (VALIUM) 5 MG tablet Take 1 tablet (5 mg total) by mouth every  6 (six) hours as needed (muscle spasm).    diclofenac sodium (VOLTAREN) 1 % Gel Apply 2 g topically 4 (four) times daily.    duloxetine (CYMBALTA) 60 MG capsule     gabapentin (NEURONTIN) 300 MG capsule 300 mg.     HYDROcodone-acetaminophen (NORCO) 5-325 mg per tablet Take 1 tablet by mouth every 24 hours as needed for Pain.    lisinopriL 10 MG tablet Take 10 mg by mouth.    meloxicam (MOBIC) 15 MG tablet Take 15 mg by mouth once daily.    metFORMIN (GLUCOPHAGE-XR) 500 MG ER 24hr tablet Take 500 mg by mouth 2 (two) times daily with meals.    metoprolol succinate (TOPROL-XL) 100 MG 24 hr tablet     multivitamin capsule Take 1 capsule by mouth once daily.    omega-3 acid ethyl esters (LOVAZA) 1 gram capsule TAKE 2 PILLS TWICE DAILY    pulse oximeter (PULSE OXIMETER) device by Apply Externally route 2 (two) times a day. Use twice daily at 8 AM and 3 PM and record the value in [employer] as directed.    rosuvastatin (CRESTOR) 20 MG tablet Take 20 mg by mouth once daily.    tadalafiL (CIALIS) 5 MG tablet Take 5 mg by mouth once daily.     Current Facility-Administered Medications   Medication    acetaminophen tablet 650 mg    albuterol inhaler 2 puff     Facility-Administered Medications Ordered in Other Visits   Medication    0.9%  NaCl infusion       PMHx, PSHx, Allergies, Medications reviewed in epic    ROS negative except pain complaints in HPI    OBJECTIVE:    There were no vitals taken for this visit.    PHYSICAL EXAMINATION:    GENERAL: Well appearing, in no acute distress, alert and oriented x3.  PSYCH:  Mood and affect appropriate.  SKIN: Skin color, texture, turgor normal, no rashes or lesions which will impact the procedure.  CV: RRR with palpation of the radial artery.  PULM: No evidence of respiratory difficulty, symmetric chest rise. Clear to auscultation.  NEURO: Cranial nerves grossly intact.    Plan:    Proceed with procedure as planned    Tj Weir  08/22/2022

## 2022-10-13 ENCOUNTER — PATIENT MESSAGE (OUTPATIENT)
Dept: PAIN MEDICINE | Facility: CLINIC | Age: 62
End: 2022-10-13
Payer: MEDICARE

## 2022-10-13 DIAGNOSIS — M54.12 CERVICAL RADICULOPATHY: Primary | ICD-10-CM

## 2022-10-13 DIAGNOSIS — M96.1 POST LAMINECTOMY SYNDROME: ICD-10-CM

## 2022-10-13 RX ORDER — HYDROCODONE BITARTRATE AND ACETAMINOPHEN 5; 325 MG/1; MG/1
1 TABLET ORAL EVERY 12 HOURS PRN
Qty: 14 TABLET | Refills: 0 | Status: SHIPPED | OUTPATIENT
Start: 2022-10-13 | End: 2022-10-20

## 2022-10-14 ENCOUNTER — PATIENT MESSAGE (OUTPATIENT)
Dept: PAIN MEDICINE | Facility: CLINIC | Age: 62
End: 2022-10-14
Payer: MEDICARE

## 2025-06-27 ENCOUNTER — OFFICE VISIT (OUTPATIENT)
Dept: OPTOMETRY | Facility: CLINIC | Age: 65
End: 2025-06-27
Payer: MEDICARE

## 2025-06-27 DIAGNOSIS — H25.13 NUCLEAR SCLEROSIS OF BOTH EYES: ICD-10-CM

## 2025-06-27 DIAGNOSIS — E11.36 TYPE 2 DIABETES MELLITUS WITH DIABETIC CATARACT, WITHOUT LONG-TERM CURRENT USE OF INSULIN: Primary | ICD-10-CM

## 2025-06-27 DIAGNOSIS — H52.7 REFRACTIVE ERROR: ICD-10-CM

## 2025-06-27 PROCEDURE — 99999 PR PBB SHADOW E&M-EST. PATIENT-LVL III: CPT | Mod: PBBFAC,,, | Performed by: OPTOMETRIST

## 2025-06-27 NOTE — PROGRESS NOTES
Subjective:       Patient ID: Turner Silva is a 65 y.o. male      Chief Complaint   Patient presents with    Diabetic Eye Exam    Concerns About Ocular Health     History of Present Illness  DLE:2019    Pt here for diabetic eye exam.  Pt states some blurry [facility] at distance x couple years.     LBS unknown     No eyedrops  No eye surgery   No flashes  No floaters  + headaches from neck issue  No eye pain  No itching  No tearing   No burning    PFamHx:  (-)glauc  (-)RD  (-)mac degen      Hemoglobin A1C       Date                     Value               Ref Range             Status                07/22/2021               6.0 (H)             4.0 - 5.6 %           Final                  03/03/2018               6.0 (H)             4.0 - 5.6 %           Final                   Assessment/Plan:     1. Type 2 diabetes mellitus with diabetic cataract, without long-term current use of insulin (Primary)  No diabetic retinopathy. Discussed with pt the effects of diabetes on vision, importance of good blood sugar control, compliance with meds, and follow up care with PCP. Return in 1 year for dilated eye exam, sooner PRN.    2. Nuclear sclerosis of both eyes  Educated pt on presence of cataracts and effects on vision. No surgery at this time. Recheck in one year, sooner PRN.    3. Refractive error  Educated patient on refractive error and discussed lens options. Dispensed updated spectacle Rx. Educated about adaptation period to new specs.    Eyeglass Final Rx       Eyeglass Final Rx         Sphere Cylinder Axis Add    Right -1.00 +1.00 150 +2.50    Left -1.50 +2.25 180 +2.50      Expiration Date: 6/27/2026                      Follow up in about 1 year (around 6/27/2026) for Diabetic Eye Exam.

## (undated) DEVICE — DRAPE C ARM 42 X 120 10/BX

## (undated) DEVICE — NDL SPINAL 18GX3.5 SPINOCAN

## (undated) DEVICE — NDL ECLIPSE SAFETY 18GX1-1/2IN

## (undated) DEVICE — UNDERGLOVES BIOGEL PI SZ 7 LF

## (undated) DEVICE — KIT EVACUATOR 3-SPRING 1/8 DRN

## (undated) DEVICE — KIT SURGIFLO HEMOSTATIC MATRIX

## (undated) DEVICE — KIT NERVE BLOCK PREP BAPTIST

## (undated) DEVICE — TUBE FRAZIER 5MM 2FT SOFT TIP

## (undated) DEVICE — SUT 3-0 VICRYL SH CR/8 18

## (undated) DEVICE — ADHESIVE DERMABOND ADVANCED

## (undated) DEVICE — DRESSING AQUACEL SACRAL LARGE

## (undated) DEVICE — DRESSING SURGICAL 1/2X1/2

## (undated) DEVICE — DRAPE C-ARM ELAS CLIP 42X120IN

## (undated) DEVICE — SUT 0 VICRYL / UR6 (J603)

## (undated) DEVICE — DRESSING AQUACEL FOAM 5 X 5

## (undated) DEVICE — SYR 10CC LUER LOCK

## (undated) DEVICE — SEE MEDLINE ITEM 156905

## (undated) DEVICE — SUT STRATAFIX 1 PDS CT-1

## (undated) DEVICE — DIFFUSER

## (undated) DEVICE — MARKER SKIN STND TIP BLUE BARR

## (undated) DEVICE — SEE MEDLINE ITEM 157148

## (undated) DEVICE — GLOVE BIOGEL SKINSENSE PI 6.0

## (undated) DEVICE — ELECTRODE REM PLYHSV RETURN 9

## (undated) DEVICE — DRAPE ABDOMINAL TIBURON 14X11

## (undated) DEVICE — SEE MEDLINE ITEM 157117

## (undated) DEVICE — DRAPE STERI-DRAPE 1000 17X11IN

## (undated) DEVICE — BLADE ELECTRO EDGE INSULATED

## (undated) DEVICE — GLOVE BIOGEL SKINSENSE PI 8.0

## (undated) DEVICE — BURR RND FLUT SFT TOUCH 2.0MM

## (undated) DEVICE — SUT MCRYL PLUS 4-0 PS2 27IN

## (undated) DEVICE — BUR BONE CUT MICRO TPS 3X3.8MM

## (undated) DEVICE — SYR 3CC LUER LOC

## (undated) DEVICE — SYR DISP LL 5CC

## (undated) DEVICE — SPONGE GAUZE 16PLY 4X4

## (undated) DEVICE — TRAY EPIDURAL CONT

## (undated) DEVICE — UNDERGLOVES BIOGEL PI SIZE 8.5

## (undated) DEVICE — SUT VICRYL PLUS 0 CT1 18IN

## (undated) DEVICE — TOWEL OR DISP STRL BLUE 4/PK

## (undated) DEVICE — BLADE 4IN EDGE INSULATED

## (undated) DEVICE — UNDERGLOVE BIOGEL PI SZ 6.5 LF

## (undated) DEVICE — GLOVE BIOGEL SKINSENSE PI 7.0

## (undated) DEVICE — CHLORAPREP 10.5 ML APPLICATOR

## (undated) DEVICE — CORD BIPOLAR 12 FOOT

## (undated) DEVICE — DRAPE OPMI STERILE

## (undated) DEVICE — CARTRIDGE OIL

## (undated) DEVICE — DRAPE C-ARMOR EQUIPMENT COVER

## (undated) DEVICE — SUT VICRYL PLUS 2-0 CT1 18

## (undated) DEVICE — SEE MEDLINE ITEM 157150